# Patient Record
Sex: FEMALE | Race: WHITE | NOT HISPANIC OR LATINO | Employment: FULL TIME | URBAN - METROPOLITAN AREA
[De-identification: names, ages, dates, MRNs, and addresses within clinical notes are randomized per-mention and may not be internally consistent; named-entity substitution may affect disease eponyms.]

---

## 2017-01-09 ENCOUNTER — TELEPHONE (OUTPATIENT)
Dept: FAMILY MEDICINE | Facility: CLINIC | Age: 37
End: 2017-01-09

## 2017-01-09 DIAGNOSIS — G43.009 MIGRAINE WITHOUT AURA AND WITHOUT STATUS MIGRAINOSUS, NOT INTRACTABLE: Primary | ICD-10-CM

## 2017-01-09 RX ORDER — RIZATRIPTAN BENZOATE 10 MG/1
10 TABLET ORAL
Qty: 18 TABLET | Refills: 1 | Status: SHIPPED | OUTPATIENT
Start: 2017-01-09 | End: 2017-01-14

## 2017-01-09 NOTE — TELEPHONE ENCOUNTER
Pt calls, added rizatriptan refill to 12/9 refill, discussed refill process, informs topiramate decreasing to 2 migraines a month, occasional 3  Prescription approved per [facility] Refill Protocol.  Yasmine Arguello RN, BSN

## 2017-01-14 ENCOUNTER — TELEPHONE (OUTPATIENT)
Dept: FAMILY MEDICINE | Facility: CLINIC | Age: 37
End: 2017-01-14

## 2017-01-14 DIAGNOSIS — G43.009 MIGRAINE WITHOUT AURA AND WITHOUT STATUS MIGRAINOSUS, NOT INTRACTABLE: Primary | ICD-10-CM

## 2017-01-14 RX ORDER — RIZATRIPTAN BENZOATE 10 MG/1
10 TABLET ORAL
Qty: 18 TABLET | Refills: 1 | Status: SHIPPED | OUTPATIENT
Start: 2017-01-14 | End: 2017-07-03

## 2017-01-14 NOTE — TELEPHONE ENCOUNTER
Calling to have prescription changed to [facility] due to cost.    Grisel Coombs RN     [place] Nurse Advisor

## 2017-01-28 ENCOUNTER — TELEPHONE (OUTPATIENT)
Dept: FAMILY MEDICINE | Facility: CLINIC | Age: 37
End: 2017-01-28

## 2017-01-28 NOTE — TELEPHONE ENCOUNTER
Pt currently fills scripts at [facility]. She is wanting to transfer her Topamax rx to [facility] as she states it is too expensive at [facility]. Please call patient at [phone] OK to . Thank you.    Opal Butler Scheduling

## 2017-01-30 NOTE — TELEPHONE ENCOUNTER
Left detailed message (ok per note below) stating to have Shanda call to get rx from [facility].

## 2017-02-17 ENCOUNTER — VIRTUAL VISIT (OUTPATIENT)
Dept: FAMILY MEDICINE | Facility: OTHER | Age: 37
End: 2017-02-17

## 2017-02-17 NOTE — PROGRESS NOTES
"Date:   Clinician: Joel Wegener  Clinician NPI: [id number]  Patient: Marie Lal  Patient : 1980  Patient Address: [address] [place] [address]  Patient Phone: [phone]  Visit Protocol: URI  Patient Summary:  Marie is a 36 year old ( : 1980 ) female who initiated a Zip for a presumed sinus infection. When asked the question \"Do you have a [place] primary care physician?\", Marie responded \"Yes\".     Her  symptoms started gradually 10-13 days ago  and consist of malaise, nasal congestion, hoarse voice, ear pain, cough, and post-nasal drainage.   She denies rhinitis, dyspnea, loss of appetite, fever, chills, sore throat, chest pain, myalgias, itchy eyes, vomiting, nausea, and dysphagia. She denies a history of facial surgery.   Her minimal nasal secretions are green and yellow. Her moderate facial pain or pressure feels worse when bending over or leaning forward and is located on both sides of her head. The facial pain or pressure started after the onset of other URI symptoms.  Marie has a mild headache. The headache did not start before her other symptoms and is located on both sides of her head.   In the past year Marie has been diagnosed with one (1) episodes of sinusitis. When asked to feel her neck she reported enlarged lymph nodes. Marie noted that the enlarged neck lymph nodes developed AFTER the onset of upper respiratory symptoms. She denies axillary lymphadenopathy.   Regarding the ear pain, the patient denies tinnitus, pain if the mouth is fully open or teeth are clenched, experiencing pain when gently pulling on the earlobe, and recent injury to the area around the ear.   She reports having mild ear pain on the ear canal area of the right ear for 8-10 days. The patient hears normally despite the ear pain.   In addition to the ear pain, Marie also reports having the following symptoms:    A feeling of fullness in the ear as if it is " clogged   Marie denies having tenderness, swelling, and redness on her ear.   Additionally, she does not experience pain when bending the chin to the chest.   She has never had tympanostomy tube placement.    Her mild (a few coughs/hr) productive cough is more bothersome at night. She believes the cough is caused by post-nasal drainage. Her cough produces green sputum.   Her highest temperature was 96 degrees Fahrenheit. Her current temperature is 96 degrees Fahrenheit. Marie has had a fever for 1 - 2 days and used the oral method for measuring her temperature.   She has passed urine in the past 12 hours.   Marie denies having COPD or other chronic lung disease.   Pulse: self-reported pulse rate as: 12 beats in 10 seconds.   Current Temperature (F): 96     Weight (in lbs): 123   She states she is not pregnant and denies breastfeeding. She has menstruated in the past month.   Marie does not smoke or use smokeless tobacco.   MEDICATIONS:  No current medications , ALLERGIES:   penicillin/amoxicillin/augmentin    Clinician Response:  Dear Marie,  Based on the information you have provided, you likely have  acute sinusitis, otherwise known as a sinus infection.   I am prescribing an antibiotic Azithromycin to help you feel better. Azithromycin comes as a 250 mg tablet. Swallow two (2) tablets on the first day then one tablet a day for days 2-5. There is no refill with this prescription.   Sinus pressure occurs when the tissues lining your sinuses become swollen and inflamed. Afrin nasal spray decreases the swelling to provide the quickest and most effective relief from sinus pressure.  Use oxymetazoline (Afrin, or store brand) nasal spray. Spray once in each nostril twice per day for a maximum of 3 days. Using this medication more frequently or longer than recommended may cause nasal congestion to reoccur or worsen. This is an over-the-counter medication you can find at most pharmacies.   Unless your are  allergic to the over-the-counter medication(s) below, I recommend using:   Ibuprofen. Take 1-3 200mg tablets (200-600mg) every 8 hours to help with the discomfort. Make sure to take the ibuprofen with food. Do not exceed 2400mg in 24 hours. This is an over-the-counter medication that does not require a prescription.   Drink plenty of liquids, especially water and take time to rest your body. This may mean taking a nap or going to bed earlier. Your body is fighting an infection and liquids and rest will improve the pace of recovery. Remember to regularly wash your hands and avoid close contact with others to prevent spreading your infection.   Some people develop allergies to antibiotics. If you notice a new rash, significant swelling or difficulty breathing, stop the medication immediately and go into a clinic for physical evaluation.   Some women may also develop a yeast infection as a side effect of taking antibiotics. If you notice symptoms of a yeast infection, please use Zipnosis to get treatment.   If you become pregnant during this course of treatment with medication, stop taking the medication and contact your primary care clinician.   Diagnosis: Acute Sinusitis  Diagnosis ICD: J01.90  Prescription: azithromycin (Zithromax) 250mg oral tablet 6 tablets, 5 days supply. Take two tablets by mouth day 1, then 1 tablet days 2-5.. Refills: 0, Refill as needed: no, Allow substitutions: yes  Pharmacy: [id number] - [phone] - [address] [place] [address]

## 2017-05-07 ENCOUNTER — VIRTUAL VISIT (OUTPATIENT)
Dept: FAMILY MEDICINE | Facility: OTHER | Age: 37
End: 2017-05-07

## 2017-05-07 NOTE — PROGRESS NOTES
"Date:   Clinician: Shirlene Ha  Clinician NPI: [id number]  Patient: Marie Lal  Patient : 1980  Patient Address: [address] [place] [address]  Patient Phone: [phone]  Visit Protocol: URI  Patient Summary:  Marie is a 36 year old ( : 1980 ) female who initiated a Visit for a presumed sinus infection. When asked the question \"Please sign me up to receive news, health information and promotions. \", Marie responded \"No\".     Her  symptoms started gradually 3 weeks ago  and consist of rhinitis, malaise, ear pain, nasal congestion, and post-nasal drainage.   She denies myalgias, cough, dysphagia, chills, loss of appetite, fever, dyspnea, vomiting, nausea, chest pain, hoarse voice, sore throat, and itchy eyes. She denies a history of facial surgery.   Her minimal nasal secretions are white, green, and yellow. Her moderate facial pain or pressure feels worse when bending over or leaning forward and is located on both sides of her head. The facial pain or pressure started after the onset of other URI symptoms.  Marie has a mild headache. The headache did not start before her other symptoms and is located on both sides of her head.   In the past year Marie has been diagnosed with one (1) episodes of sinusitis. When asked to feel her neck she denied feeling enlarged lymph nodes. She denies axillary lymphadenopathy.   Regarding the ear pain, the patient denies experiencing pain when gently pulling on the earlobe, recent injury to the area around the ear, pain if the mouth is fully open or teeth are clenched, and tinnitus.   She reports having mild ear pain on the ear canal area of both ears for 8-10 days. The patient hears normally despite the ear pain.   In addition to the ear pain, Marie also reports having the following symptoms:    A feeling of fullness in the ear as if it is clogged   Marie denies having redness, swelling, and tenderness on her " ear.   Additionally, she does not experience pain when bending the chin to the chest.   She has never had tympanostomy tube placement.    Her highest temperature was 96.7 degrees Fahrenheit and her current temperature is 96.8 degrees Fahrenheit. She used the temporal method for measuring her temperature.   She has passed urine in the past 12 hours.   Marie denies having COPD or other chronic lung disease.   Pulse: self-reported pulse rate as: 12 beats in 10 seconds.   Current Temperature (F): 96.8     Weight (in lbs): 123   She states she is not pregnant and denies breastfeeding. She has menstruated in the past month.   Marie does not smoke or use smokeless tobacco.   Additional information as reported by the patient (free text): I'd like to know if I could try a 10 antibiotic vs a 5 day as the 5 day ones don't seem to fully get rid of the infection and I end up with another infection soon after.   MEDICATIONS:  No current medications , ALLERGIES:   penicillin/amoxicillin/augmentin    Clinician Response:  Dear Marie,  Based on the information you have provided, you likely have  acute sinusitis, otherwise known as a sinus infection.   I am prescribing Doxycycline Hyclate [Vibramycin] 100 mg. Take one tablet by mouth two times a day for 7 days. There are no refills with this prescription.   Sinus pressure occurs when the tissues lining your sinuses become swollen and inflamed. Afrin nasal spray decreases the swelling to provide the quickest and most effective relief from sinus pressure. Use oxymetazoline (Afrin, or store brand) nasal spray. Spray once in each nostril twice per day for a maximum of 3 days. Using this medication more frequently or longer than recommended may cause nasal congestion to reoccur or worsen. This is an over-the-counter medication you can find at most pharmacies.   I am also prescribing Flonase nasal spray. Flonase will also help reduce swelling in your sinuses, but it works differently  than Afrin, so use both nasal sprays. Spray the Flonase twice (2 times) in each nostril every day at approximately the same time each day until you feel better. This medication takes several days to start working, so keep taking it if it doesn't help right away.   Drink plenty of liquids, especially water and take time to rest your body. This may mean taking a nap or going to bed earlier. Your body is fighting an infection and liquids and rest will improve the pace of recovery. Remember to regularly wash your hands and avoid close contact with others to prevent spreading your infection.   Some people develop allergies to antibiotics. If you notice a new rash, significant swelling or difficulty breathing, stop the medication immediately and go into a clinic for physical evaluation.   Some women may also develop a yeast infection as a side effect of taking antibiotics. If you notice symptoms of a yeast infection, please use [facility] to get treatment.   If you become pregnant during this course of treatment with medication, stop taking the medication and contact your primary care provider.   Diagnosis: Acute Sinusitis  Diagnosis ICD: J01.90  Prescription: doxycycline Hyclate (Vibramycin) 100mg oral tablet 14 tablets, 7 days supply. Take one tablet by mouth two times a day for 7 days. Refills: 0, Refill as needed: no, Allow substitutions: yes  Prescription: fluticasone propionate (Flonase) 50mcg nasal spray 16 gm, 30 days supply. Take one or two inhalations in each nostril one time a day. Refills: 0, Refill as needed: no, Allow substitutions: yes  Prescription Sent At: May 07 11:26:04, 2017  Pharmacy: [id number] - [phone] - [address] [place] [address]

## 2017-07-03 ENCOUNTER — VIRTUAL VISIT (OUTPATIENT)
Dept: FAMILY MEDICINE | Facility: OTHER | Age: 37
End: 2017-07-03

## 2017-07-03 DIAGNOSIS — G43.009 MIGRAINE WITHOUT AURA AND WITHOUT STATUS MIGRAINOSUS, NOT INTRACTABLE: ICD-10-CM

## 2017-07-03 NOTE — TELEPHONE ENCOUNTER
Riztriptan      Last Written Prescription Date: 01/14/17  Last Fill Quantity: 18, # refills: 1  Last Office Visit with [facility], [facility] or [facility] prescribing provider: 10/20/16 w/Dr Walden       BP Readings from Last 3 Encounters:   10/20/16 99/67   11/11/15 98/60   10/08/15 106/60

## 2017-07-03 NOTE — PROGRESS NOTES
"Date:   Clinician: Tiffani Luna  Clinician NPI: [id number]  Patient: Marie Lal  Patient : 1980  Patient Address: [address] [place] [address]  Patient Phone: [phone]  Visit Protocol: URI  Patient Summary:  Marie is a 36 year old ( : 1980 ) female who initiated a Visit for a presumed sinus infection. When asked the question \"Please sign me up to receive news, health information and promotions. \", Marie responded \"No\".     Her  symptoms started gradually 10-13 days ago  and consist of rhinitis, nasal congestion, post-nasal drainage, malaise, cough, ear pain, and sore throat.   She denies headache, hoarse voice, fever, chills, loss of appetite, dysphagia, itchy eyes, myalgias, chest pain, dyspnea, nausea, petechial or purpuric rash, and vomiting. She denies a history of facial surgery.   Her minimal nasal secretions are green and yellow. Her moderate facial pain or pressure feels worse when bending over or leaning forward and is located on both sides of her head. The facial pain or pressure started after the onset of other URI symptoms.    In the past year Marie has been diagnosed with two (2) episodes of sinusitis.   She has a mildly painful sore throat. The patient indicates having white spots on the tonsils similar to a sample strep throat image provided. She might have been exposed to Strep. When asked to feel her neck she reported enlarged lymph nodes. Marie noted that the enlarged neck lymph nodes developed AFTER the onset of upper respiratory symptoms. She denies axillary lymphadenopathy.   Regarding the ear pain, the patient denies pain if the mouth is fully open or teeth are clenched, tinnitus, recent injury to the area around the ear, and experiencing pain when gently pulling on the earlobe.   She reports having mild ear pain on the ear canal area of both ears for 8-10 days. The ear pain has caused a slight decrease in hearing.   In " addition to the ear pain, Marie also reports having the following symptoms:    A feeling of fullness in the ear as if it is clogged   Marie denies having swelling, tenderness, and redness on her ear.   Additionally, she does not experience pain when bending the chin to the chest.   She has never had tympanostomy tube placement.    Her mild (a few coughs/hr) productive cough is more bothersome at night. She believes the cough is caused by post-nasal drainage. Her cough produces yellow sputum.   Her highest temperature was 97.6 degrees Fahrenheit and her current temperature is 97.6 degrees Fahrenheit. She used the temporal method for measuring her temperature.   She has passed urine in the past 12 hours.   Marie denies having COPD or other chronic lung disease.   Pulse: self-reported pulse rate as: 13 beats in 10 seconds.   Current Temperature (F): 97.6     Weight (in lbs): 125   She states she is not pregnant and denies breastfeeding. She has menstruated in the past month.   Marie does not smoke or use smokeless tobacco.  MEDICATIONS:   Patient free text response:  Topiramate  , ALLERGIES:   penicillin/amoxicillin/augmentin    Clinician Response:  Dear Marie,  Based on the information you have provided, you likely have  acute sinusitis, otherwise known as a sinus infection. I am prescribing sulfamethoxazole-TMP DS (Bactrim DS) 800-160mg. Take one tablet by mouth two times a day for 14 days.   Unless your are allergic to the over-the-counter medication(s) below, I recommend using:   Saline nasal spray (such as Ocean or store brand). Use 1-2 sprays in each nostril 3 times a day as needed for congestion. This is an over-the-counter medication that does not require a prescription.   Try the following to help with your throat pain and discomfort:     Use throat lozenges    Gargle with warm salt water (1/4 teaspoon of salt per 8 ounce glass of water).    Suck on frozen items such as Popsicles or ice cubes.      Call 911 or go to the emergency room if you feel that your throat is closing off, you suddenly develop a rash, you are unable to swallow fluids, you are drooling, or you are having difficulty breathing.  Follow up with your primary care provider if your symptoms are not improving in 3-4 days.   Drink plenty of liquids, especially water and take time to rest your body. This may mean taking a nap or going to bed earlier. Your body is fighting an infection and liquids and rest will improve the pace of recovery. Remember to regularly wash your hands and avoid close contact with others to prevent spreading your infection.   Some people develop allergies to antibiotics. If you notice a new rash, significant swelling or difficulty breathing, stop the medication immediately and go into a clinic for physical evaluation.   Some women may also develop a yeast infection as a side effect of taking antibiotics. If you notice symptoms of a yeast infection, please use [facility] to get treatment.   If you become pregnant during this course of treatment with medication, stop taking the medication and contact your primary care provider.   Diagnosis: Acute Sinusitis  Diagnosis ICD: J01.90  Prescription: Sulfamethoxazole-TMP DS (Bactrim DS) 800-160 mg oral tablet 28 tablets, 14 days supply. Take one tablet by mouth two times a day for 14 days. Refills: 0, Refill as needed: no, Allow substitutions: yes  Pharmacy: [id number] - [phone] - [address] [place] [address]

## 2017-07-05 NOTE — TELEPHONE ENCOUNTER
Routing refill request to provider for review/approval because  Visit due?  Van Mulligan, RN

## 2017-07-06 RX ORDER — RIZATRIPTAN BENZOATE 10 MG/1
TABLET ORAL
Qty: 18 TABLET | Refills: 0 | Status: SHIPPED | OUTPATIENT
Start: 2017-07-06 | End: 2017-10-03

## 2017-10-03 ENCOUNTER — VIRTUAL VISIT (OUTPATIENT)
Dept: FAMILY MEDICINE | Facility: OTHER | Age: 37
End: 2017-10-03

## 2017-10-03 DIAGNOSIS — G43.009 MIGRAINE WITHOUT AURA AND WITHOUT STATUS MIGRAINOSUS, NOT INTRACTABLE: ICD-10-CM

## 2017-10-03 NOTE — PROGRESS NOTES
"Date:   Clinician: Faheem Jordan  Clinician NPI: [id number]  Patient: Marie Lal  Patient : 1980  Patient Address:  [facility], [place], [place] [address]  Patient Phone: [phone]  Visit Protocol: URI  Patient Summary:  Marie is a 37 year old ( : 1980 ) female who initiated a Visit for cold, sinus infection, or influenza. When asked the question \"Please sign me up to receive news, health information and promotions. \", Marie responded \"No\".    Marie states her symptoms started gradually 1 month or more ago.   Her symptoms consist of nasal congestion, malaise, myalgia, a sore throat, rhinitis, ear pain, facial pain or pressure, a headache, and tooth pain.   Symptom Details     Nasal secretions: The color of her mucus is green and yellow.    Sore throat: Marie reports having mild throat pain, does not have exudate on her tonsils, and is able to swallow liquids. The lymph nodes in her neck are not enlarged. She states that rashes have not appeared on the skin since the sore throat started.     Facial pain or pressure: She has not had the facial pain or pressure for 12 weeks or more. The facial pain or pressure feels worse when bending over or leaning forward.     Headache: Marie has not had the headache for 12 weeks or more. She states the headache is moderate.     Tooth pain: The tooth pain is not caused by a cavity, recent dental work, or other mouth problems.      Marie denies having enlarged lymph nodes, wheezing, dyspnea, cough, chills, and fever. She also denies taking antibiotic medication for the symptoms, having recent facial or sinus surgery in the past 60 days, and double sickening.   Within the past week, Marie has not been exposed to someone with strep throat. She has not recently been exposed to someone with influenza. Marie has been in close contact with the following high risk individuals: people with asthma, heart disease or diabetes and " children under the age of 5.   Weight: 123 lbs   Marie does not smoke or use smokeless tobacco.   She denies pregnancy and denies breastfeeding. She is currently menstruating.  MEDICATIONS:   Patient free text response: Rizatriptin  , ALLERGIES:   penicillin/amoxicillin/augmentin    Clinician Response:  Dear Marie,  Based on the information you have provided, you likely have  acute bacterial sinusitis, otherwise known as a sinus infection.  I am prescribing fluticasone propionate nasal (Flonase) 50 mcg/spray. Take one or two inhalations in each nostril one time a day. There are no refills with this prescription.   I am prescribing cefdinir (Omnicef). Take one capsule by mouth two times a day for 10 days. There are no refills with this prescription.   Unless your are allergic to the over-the-counter medication(s) below, I recommend using:   Guaifenesin + dextromethorphan (Robitussin DM, Mucinex DM). This is an over-the-counter medication that does not require a prescription.   A decongestant such as pseudoephedrine (Sudafed or store brand) to help your symptoms. This is an over-the-counter medication that does not require a prescription.   Ibuprofen. Take 1-3 tablets (200-600mg) every 8 hours to help with the discomfort. Make sure to take the ibuprofen with food. Do not exceed 2400mg in 24 hours. This is an over-the-counter medication that does not require a prescription.   Some people develop allergies to antibiotics. If you have significant swelling or difficulty breathing, stop the medication immediately and call 911 or go to an emergency room. If you notice a new rash, be seen at a clinic or urgent care.  Some women may also develop a yeast infection as a side effect of taking antibiotics. If you notice symptoms of a yeast infection, please use [facility] to get treatment.  If you become pregnant during this course of treatment, stop taking the medication and contact your primary care provider.  You will feel  better faster if you take care of yourself by getting more rest and drinking plenty of liquids, especially water.  Remember to wash your hands often and stay home while you are sick to decrease the chance you will spread your infection to others.  Mild ear pain is a common symptom of an upper respiratory infection and should lessen gradually as other symptoms improve.  Try the following to help with your throat pain and discomfort:     Use throat lozenges    Gargle with warm salt water (1/4 teaspoon of salt per 8 ounce glass of water)    Suck on frozen items such as popsicles or ice cubes     Call 911 or go to the emergency room if you feel that your throat is closing off, you suddenly develop a rash, you are unable to swallow fluids, you are drooling, or you are having difficulty breathing.   Diagnosis: Acute bacterial sinusitis  Diagnosis ICD: J01.90  Prescription: fluticasone propionate (Flonase) 50mcg nasal spray 16 gm, 30 days supply. Take one or two inhalations in each nostril one time a day. Refills: 0, Refill as needed: no, Allow substitutions: yes  Prescription: cefdinir (Omnicef) 300 mg oral capsule 20 capsules, 10 days supply. Take one capsule by mouth two times a day for 10 days. Refills: 0, Refill as needed: no, Allow substitutions: yes  Pharmacy: [id number] - [phone] - [address] [place] [address]

## 2017-10-03 NOTE — TELEPHONE ENCOUNTER
RIZATRIPTAN 10MG TAB        Last Written Prescription Date: 07/06/17  Last Fill Quantity: 18, # refills: 0  Last Office Visit with [facility], [facility] or [facility] prescribing provider: 10/20/16 Dr Walden        BP Readings from Last 3 Encounters:   10/20/16 99/67   11/11/15 98/60   10/08/15 106/60

## 2017-10-04 ENCOUNTER — MYC MEDICAL ADVICE (OUTPATIENT)
Dept: FAMILY MEDICINE | Facility: CLINIC | Age: 37
End: 2017-10-04

## 2017-10-04 RX ORDER — RIZATRIPTAN BENZOATE 10 MG/1
TABLET ORAL
Qty: 18 TABLET | Refills: 0 | Status: SHIPPED | OUTPATIENT
Start: 2017-10-04 | End: 2017-10-12

## 2017-10-04 NOTE — TELEPHONE ENCOUNTER
Prescription approved per [facility] Refill Protocol.  Pt informed via MyChart visit due before next refill  Van Mulligan RN

## 2017-10-12 ENCOUNTER — OFFICE VISIT (OUTPATIENT)
Dept: FAMILY MEDICINE | Facility: CLINIC | Age: 37
End: 2017-10-12
Payer: COMMERCIAL

## 2017-10-12 VITALS
HEIGHT: 61 IN | SYSTOLIC BLOOD PRESSURE: 99 MMHG | HEART RATE: 92 BPM | WEIGHT: 127 LBS | RESPIRATION RATE: 20 BRPM | DIASTOLIC BLOOD PRESSURE: 65 MMHG | TEMPERATURE: 98.5 F | BODY MASS INDEX: 23.98 KG/M2 | OXYGEN SATURATION: 99 %

## 2017-10-12 DIAGNOSIS — G43.009 MIGRAINE WITHOUT AURA AND WITHOUT STATUS MIGRAINOSUS, NOT INTRACTABLE: ICD-10-CM

## 2017-10-12 PROCEDURE — 99214 OFFICE O/P EST MOD 30 MIN: CPT | Performed by: FAMILY MEDICINE

## 2017-10-12 RX ORDER — RIZATRIPTAN BENZOATE 10 MG/1
TABLET ORAL
Qty: 18 TABLET | Refills: 0 | Status: SHIPPED | OUTPATIENT
Start: 2017-10-12 | End: 2018-01-17

## 2017-10-12 RX ORDER — TOPIRAMATE 50 MG/1
50 TABLET, FILM COATED ORAL 2 TIMES DAILY
Qty: 180 TABLET | Refills: 3 | Status: SHIPPED | OUTPATIENT
Start: 2017-10-12 | End: 2018-04-12

## 2017-10-12 NOTE — MR AVS SNAPSHOT
After Visit Summary   10/12/2017    Marie Lal    MRN: [id number]           Patient Information     Date Of Birth          1980        Visit Information        Provider Department      10/12/2017 4:45 PM Melva Walden MD [facility]        Today's Diagnoses     Migraine without aura and without status migrainosus, not intractable          Care Instructions    In controlled months : take Topamax 50 mg 1/2 in Am and 1/2 at night.  In worse months, take Topamax 50 mg 1 tab in Am, and 1/2 to 1 tab at night .          Follow-ups after your visit        Who to contact     If you have questions or need follow up information about today's clinic visit or your schedule please contact [facility] directly at [phone].  Normal or non-critical lab and imaging results will be communicated to you by MyChart, letter or phone within 4 business days after the clinic has received the results. If you do not hear from us within 7 days, please contact the clinic through MyChart or phone. If you have a critical or abnormal lab result, we will notify you by phone as soon as possible.  Submit refill requests through [facility] or call your pharmacy and they will forward the refill request to us. Please allow 3 business days for your refill to be completed.          Additional Information About Your Visit        MyChart Information     [facility] gives you secure access to your electronic health record. If you see a primary care provider, you can also send messages to your care team and make appointments. If you have questions, please call your primary care clinic.  If you do not have a primary care provider, please call [phone] and they will assist you.        Care EveryWhere ID     This is your Care EveryWhere ID. This could be used by other organizations to access your [place] medical records  [id number]        Your Vitals Were     Pulse Temperature Respirations Height  "Pulse Oximetry BMI (Body Mass Index)    92 98.5  F (36.9  C) (Oral) 20 5' 1\" (1.549 m) 99% 24 kg/m2       Blood Pressure from Last 3 Encounters:   10/12/17 99/65   10/20/16 99/67   11/11/15 98/60    Weight from Last 3 Encounters:   10/12/17 127 lb (57.6 kg)   10/20/16 125 lb (56.7 kg)   11/11/15 125 lb (56.7 kg)              Today, you had the following     No orders found for display         Today's Medication Changes          These changes are accurate as of: 10/12/17  5:16 PM.  If you have any questions, ask your nurse or doctor.               These medicines have changed or have updated prescriptions.        Dose/Directions    rizatriptan 10 MG tablet   Commonly known as:  MAXALT   This may have changed:  See the new instructions.   Used for:  Migraine without aura and without status migrainosus, not intractable   Changed by:  Melva Walden MD        TAKE ONE TABLET BY MOUTH AT ONSET OF HEADACHE FOR MIGRAINE. MAY REPEAT DOSE IN 2 HOURS. DO NOT TAKE MORE THAN 3 TABLETS IN 24 HOURS.   Quantity:  18 tablet   Refills:  0       topiramate 50 MG tablet   Commonly known as:  TOPAMAX   This may have changed:    - medication strength  - how much to take   Used for:  Migraine without aura and without status migrainosus, not intractable   Changed by:  Melva Walden MD        Dose:  50 mg   Take 1 tablet (50 mg) by mouth 2 times daily   Quantity:  180 tablet   Refills:  3            Where to get your medicines      These medications were sent to [facility] [address] [place] [id number] [facility]  [address] [facility] [place] [address]     Phone:  [phone]     rizatriptan 10 MG tablet    topiramate 50 MG tablet                Primary Care Provider Office Phone # Fax #    Melva Walden [phone] [address]  [facility] [address]        Equal Access to Services     RADHA JAMISON AH: Hadii aad ku hadasho Soomaali, waaxda luqadaha, qaybta kaalmada adecorinna, meri babb " david dumont ah. So [facility] [place] [phone].    ATENCIÓN: Si mata apple, tiene a العراقي disposición servicios gratuitos de asistencia lingüística. Yvette al [phone].    We comply with applicable federal civil rights laws and [place] laws. We do not discriminate on the basis of race, color, national origin, age, disability, sex, sexual orientation, or gender identity.            Thank you!     Thank you for choosing [facility]  for your care. Our goal is always to provide you with excellent care. Hearing back from our patients is one way we can continue to improve our services. Please take a few minutes to complete the written survey that you may receive in the mail after your visit with us. Thank you!             Your Updated Medication List - Protect others around you: Learn how to safely use, store and throw away your medicines at www.disposemymeds.org.          This list is accurate as of: 10/12/17  5:16 PM.  Always use your most recent med list.                   Brand Name Dispense Instructions for use Diagnosis    rizatriptan 10 MG tablet    MAXALT    18 tablet    TAKE ONE TABLET BY MOUTH AT ONSET OF HEADACHE FOR MIGRAINE. MAY REPEAT DOSE IN 2 HOURS. DO NOT TAKE MORE THAN 3 TABLETS IN 24 HOURS.    Migraine without aura and without status migrainosus, not intractable       topiramate 50 MG tablet    TOPAMAX    180 tablet    Take 1 tablet (50 mg) by mouth 2 times daily    Migraine without aura and without status migrainosus, not intractable

## 2017-10-12 NOTE — PATIENT INSTRUCTIONS
In controlled months : take Topamax 50 mg 1/2 in Am and 1/2 at night.  In worse months, take Topamax 50 mg 1 tab in Am, and 1/2 to 1 tab at night .

## 2017-10-12 NOTE — NURSING NOTE
"Chief Complaint   Patient presents with     Recheck Medication     migraine medications     Refill Request       Initial BP 99/65 (BP Location: Right arm, Patient Position: Chair, Cuff Size: Adult Regular)  Pulse 92  Temp 98.5  F (36.9  C) (Oral)  Resp 20  Ht 5' 1\" (1.549 m)  Wt 127 lb (57.6 kg)  SpO2 99%  BMI 24 kg/m2 Estimated body mass index is 24 kg/(m^2) as calculated from the following:    Height as of this encounter: 5' 1\" (1.549 m).    Weight as of this encounter: 127 lb (57.6 kg).  Medication Reconciliation: complete   Maryse Rodriges, TERRI      "

## 2017-10-12 NOTE — PROGRESS NOTES
SUBJECTIVE:   Marie Lal is a 37 year old female who presents to clinic today for the following health issues:      Medication Followup of migraine medications    Taking Medication as prescribed: yes    Side Effects:  None    Medication Helping Symptoms:  yes         Headaches      Duration: chornic.    Description  Location: unilateral in the left frontal area   Character: throbbing pain  Frequency:  Varies, sometimes 3 to 4 times a week, sometimes once every 2 weeks.  Duration:  Day.    Intensity:  severe    Accompanying signs and symptoms:    Precipitating or Alleviating factors:  Nausea/vomiting: sometimes  Dizziness: no  Weakness or numbness: no  Visual changes: none  Fever: no   Sinus or URI symptoms YES    History  Head trauma: no  Family history of migraines: YES, grandmother.  Previous tests for headaches: YES-   Neurologist evaluations: YES  Able to do daily activities when headache present: no  Wake with headaches: sometimes.  Daily pain medication use: no  Any changes in recent stresslevel, none.    Precipitating or Alleviating factors (light/sound/sleep/caffeine): stress.    Therapies tried and outcome: Maxalt    Outcome - effective  Frequent/daily pain medication use: no        Problem list and histories reviewed & adjusted, as indicated.  Additional history: as documented    Patient Active Problem List   Diagnosis     CARDIOVASCULAR SCREENING; LDL GOAL LESS THAN 160     Migraine headache     Past Surgical History:   Procedure Laterality Date      SECTION        SECTION  2013    Procedure:  SECTION;   SECTION ;  Surgeon: Joey Chirinos MD;  Location:  [facility]       Social History   Substance Use Topics     Smoking status: Former Smoker     Years: 1.00     Quit date: 2013     Smokeless tobacco: Never Used      Comment: quit with diagnosis of pregnancy     Alcohol use No     Family History   Problem Relation Age of Onset     Hypertension Mother   "    CANCER Maternal Grandfather      Bone         Current Outpatient Prescriptions   Medication Sig Dispense Refill     rizatriptan (MAXALT) 10 MG tablet TAKE ONE TABLET BY MOUTH AT ONSET OF HEADACHE FOR MIGRAINE. MAY REPEAT DOSE IN 2 HOURS. DO NOT TAKE MORE THAN 3 TABLETS IN 24 HOURS. 18 tablet 0     topiramate (TOPAMAX) 50 MG tablet Take 1 tablet (50 mg) by mouth 2 times daily 180 tablet 3     [DISCONTINUED] topiramate (TOPAMAX) 25 MG tablet Take 1 tablet (25 mg) by mouth 2 times daily 180 tablet 3         Reviewed and updated as needed this visit by clinical staffTobacco  Allergies  Med Hx  Surg Hx  Fam Hx  Soc Hx      Reviewed and updated as needed this visit by Provider         ROS:  C: NEGATIVE for fever, chills, change in weight  NEURO: NEGATIVE for weakness, dizziness or paresthesias    OBJECTIVE:     BP 99/65 (BP Location: Right arm, Patient Position: Chair, Cuff Size: Adult Regular)  Pulse 92  Temp 98.5  F (36.9  C) (Oral)  Resp 20  Ht 5' 1\" (1.549 m)  Wt 127 lb (57.6 kg)  SpO2 99%  BMI 24 kg/m2  Body mass index is 24 kg/(m^2).  GENERAL: healthy, alert and no distress  NEURO: Normal strength and tone, sensory exam grossly normal, mentation intact and cranial nerves 2-12 intact        ASSESSMENT/PLAN:             1. Migraine without aura and without status migrainosus, not intractable  mnot well controlled, increase topamax to 50 mg twice daily  - rizatriptan (MAXALT) 10 MG tablet; TAKE ONE TABLET BY MOUTH AT ONSET OF HEADACHE FOR MIGRAINE. MAY REPEAT DOSE IN 2 HOURS. DO NOT TAKE MORE THAN 3 TABLETS IN 24 HOURS.  Dispense: 18 tablet; Refill: 0  - topiramate (TOPAMAX) 50 MG tablet; Take 1 tablet (50 mg) by mouth 2 times daily  Dispense: 180 tablet; Refill: 3    Follow up in 6 months to 1 year.    Melva Walden MD  [facility]"

## 2017-10-30 ENCOUNTER — TRANSFERRED RECORDS (OUTPATIENT)
Dept: HEALTH INFORMATION MANAGEMENT | Facility: CLINIC | Age: 37
End: 2017-10-30

## 2017-11-02 ENCOUNTER — TRANSFERRED RECORDS (OUTPATIENT)
Dept: HEALTH INFORMATION MANAGEMENT | Facility: CLINIC | Age: 37
End: 2017-11-02

## 2017-11-03 ENCOUNTER — OFFICE VISIT (OUTPATIENT)
Dept: URGENT CARE | Facility: URGENT CARE | Age: 37
End: 2017-11-03
Payer: COMMERCIAL

## 2017-11-03 VITALS
WEIGHT: 127 LBS | BODY MASS INDEX: 24 KG/M2 | DIASTOLIC BLOOD PRESSURE: 80 MMHG | OXYGEN SATURATION: 98 % | HEART RATE: 84 BPM | TEMPERATURE: 97.8 F | SYSTOLIC BLOOD PRESSURE: 120 MMHG

## 2017-11-03 DIAGNOSIS — A04.72 C. DIFFICILE COLITIS: ICD-10-CM

## 2017-11-03 DIAGNOSIS — B37.0 THRUSH: Primary | ICD-10-CM

## 2017-11-03 DIAGNOSIS — K14.3 COATED TONGUE: ICD-10-CM

## 2017-11-03 LAB
KOH PREP SPEC: ABNORMAL
SPECIMEN SOURCE: ABNORMAL

## 2017-11-03 PROCEDURE — 87210 SMEAR WET MOUNT SALINE/INK: CPT | Performed by: PHYSICIAN ASSISTANT

## 2017-11-03 PROCEDURE — 99214 OFFICE O/P EST MOD 30 MIN: CPT | Performed by: PHYSICIAN ASSISTANT

## 2017-11-03 RX ORDER — METRONIDAZOLE 500 MG/1
500 TABLET ORAL
COMMUNITY
Start: 2017-10-30 | End: 2017-11-13

## 2017-11-03 RX ORDER — CLOTRIMAZOLE 10 MG/1
1 LOZENGE ORAL
Qty: 70 TROCHE | Refills: 0 | Status: SHIPPED | OUTPATIENT
Start: 2017-11-03 | End: 2017-11-17

## 2017-11-03 NOTE — PROGRESS NOTES
SUBJECTIVE:   Marie Lal is a 37 year old female presenting with a chief complaint of numbness of tongue and white coating on tongue since starting metronidazole for presumptive C. Diff colitis.  She was seen at [facility] in [place] and obtained a CT scan which showed diffuse thickening of colon c/w colitis.  She had recently completed a course of Omnicef for sinusitis. Today she noticed a numbness of her tongue and cheeks and white patches in those areas as well.  Onset of symptoms was 1 day(s) ago.  Course of illness is worsening.    Severity moderate  Current and Associated symptoms: body aches and fatigue  Treatment measures tried include None tried.  Predisposing factors include multiple antibiotics (Omnicef and metronidazole)    Past Medical History:   Diagnosis Date     Epilepsy seizure, generalized, convulsive (H)     resolved, last took med 1/2006     Menarche 15     Migraine     rare, takes motrin, 1 per month at most, contolled     Seizures (H)     None for 10 years - no medications     Shingles 30    right buttock     Varicella 9     Current Outpatient Prescriptions   Medication Sig Dispense Refill     metroNIDAZOLE (FLAGYL) 500 MG tablet Take 500 mg by mouth       rizatriptan (MAXALT) 10 MG tablet TAKE ONE TABLET BY MOUTH AT ONSET OF HEADACHE FOR MIGRAINE. MAY REPEAT DOSE IN 2 HOURS. DO NOT TAKE MORE THAN 3 TABLETS IN 24 HOURS. 18 tablet 0     topiramate (TOPAMAX) 50 MG tablet Take 1 tablet (50 mg) by mouth 2 times daily 180 tablet 3     Social History   Substance Use Topics     Smoking status: Former Smoker     Years: 1.00     Quit date: 1/1/2013     Smokeless tobacco: Never Used      Comment: quit with diagnosis of pregnancy     Alcohol use No       ROS:  Review of systems negative except as stated above.    OBJECTIVE:  /80 (BP Location: Right arm, Cuff Size: Adult Regular)  Pulse 84  Temp 97.8  F (36.6  C) (Oral)  Wt 127 lb (57.6 kg)  LMP 11/02/2017  SpO2 98%  BMI 24  kg/m2  GENERAL APPEARANCE: healthy, alert and no distress  EYES: EOMI,  PERRL, conjunctiva clear  HENT: ear canals and TM's normal.  Nose and mouth without ulcers, erythema or lesions  SKIN: no suspicious lesions or rashes    Results for orders placed or performed in visit on 11/03/17   KOH prep (Other than skin, nails, hair)   Result Value Ref Range    Specimen Description Oral     KOH Prep Fungal elements seen (A)        ASSESSMENT:  Marie was seen today for urgent care and mouth problem.    Diagnoses and all orders for this visit:    Thrush,   C. difficile colitis  Coated tongue  KOH prep and clinical presentation c/w thrush.  Due to medication interaction unable to use nystatin swish/swallow.  Will have pt start clotrimazole david 5x/daily for 2 days after symptom resolution.  Pt voiced understanding.  She will f/u with PCP if not improving.  -     clotrimazole 10 MG david; Place 1 David (10 mg) inside cheek 5 times daily for 14 days  -     KOH prep (Other than skin, nails, hair)      Ghazala Landry MS, PAKevinC

## 2017-11-03 NOTE — PATIENT INSTRUCTIONS
Thrush  What is thrush?  Thrush is a fungal infection caused by a yeast called candida. In babies, thrush often occurs in areas where the lining of the mouth is irritated. Many babies get thrush in the mouth in the first few weeks or months of life. If your child has thrush, he or she will have:  White, irregularly shaped patches coat the inside of the lips and cheeks and sometimes the tongue. (If the only symptom is a uniformly white tongue, it's due to a milk diet, not thrush.)   White coating that sticks to the mouth and cannot be washed away or wiped off.  Thrush causes mild mouth discomfort.  What is the cause?  Most people already have candida in their mouth and other parts of their bodies. In babies, thrush often occurs in areas where the lining of the mouth is irritated from too much sucking (as when a baby sleeps with a bottle or pacifier). A large pacifier or nipple can more easily injure the lining of the mouth. Thrush is generally not spread to others under normal conditions. However, if you are breast-feeding and your baby has thrush, the yeast could flare up and cause thrush on your breasts.  How can I take care of my child?  Nystatin oral medicineThe drug for clearing this up is nystatin oral suspension. It requires a prescription.Give 1 ml of nystatin 4 times a day after meals or at least 30 minutes before you feed your baby. Place the nystatin in the front of the mouth on each side. It doesn't do any good once it's swallowed. If the patches of thrush in the mouth don't start improving in 2 days, rub the nystatin directly on the patches. Use a cotton swab or a gauze wrapped on your finger. Keep this up for at least 7 days, or until all the thrush has been gone for 3 days.If you are breast-feeding, apply nystatin to any irritated areas on your nipples.   Decrease sucking time during feedingIf sucking on a nipple is painful for your child, temporarily use a cup and spoon. In any case,  while your child has thrush, reduce sucking time to 20 minutes or less per feeding.If the thrush comes back after treatment and your child is bottle-fed, switch to a nipple with a different shape and made from silicone.   Restrict pacifier useWhile your child has thrush don't give him a pacifier, except when it's really needed to calm your baby. If your infant is using an orthodontic-type pacifier, switch to a smaller, regular one. Special washing or boiling of bottle nipples or pacifiers is not necessary or helpful.   Diaper rash associated with thrushIf your child has a diaper rash as well as thrush, assume the rash is caused by yeast. Buy Lotrimin cream at your local pharmacy and put it on your baby's bottom 4 times a day.  When should I call my child's healthcare provider?  Call during office hours if:  Your child refuses to drink.   The thrush gets worse during treatment.   The thrush lasts beyond 10 days despite treatment.   You have other concerns or questions.    Published by [employer].  This content is reviewed periodically and is subject to change as new health information becomes available. The information is intended to inform and educate and is not a replacement for medical evaluation, advice, diagnosis or treatment by a healthcare professional.

## 2017-11-03 NOTE — PROGRESS NOTES
Results discussed directly with patient while patient was present. Any further details documented in the note.   Ghazala Landry PA-C

## 2017-11-03 NOTE — MR AVS SNAPSHOT
After Visit Summary   11/3/2017    Marie Lal    MRN: [id number]           Patient Information     Date Of Birth          1980        Visit Information        Provider Department      11/3/2017 5:20 PM Ghazala Landry PA-C [facility]        Today's Diagnoses     Thrush    -  1    Coated tongue        C. difficile colitis          Care Instructions                  Thrush  What is thrush?  Thrush is a fungal infection caused by a yeast called candida. In babies, thrush often occurs in areas where the lining of the mouth is irritated. Many babies get thrush in the mouth in the first few weeks or months of life. If your child has thrush, he or she will have:  White, irregularly shaped patches coat the inside of the lips and cheeks and sometimes the tongue. (If the only symptom is a uniformly white tongue, it's due to a milk diet, not thrush.)   White coating that sticks to the mouth and cannot be washed away or wiped off.  Thrush causes mild mouth discomfort.  What is the cause?  Most people already have candida in their mouth and other parts of their bodies. In babies, thrush often occurs in areas where the lining of the mouth is irritated from too much sucking (as when a baby sleeps with a bottle or pacifier). A large pacifier or nipple can more easily injure the lining of the mouth. Thrush is generally not spread to others under normal conditions. However, if you are breast-feeding and your baby has thrush, the yeast could flare up and cause thrush on your breasts.  How can I take care of my child?  Nystatin oral medicineThe drug for clearing this up is nystatin oral suspension. It requires a prescription.Give 1 ml of nystatin 4 times a day after meals or at least 30 minutes before you feed your baby. Place the nystatin in the front of the mouth on each side. It doesn't do any good once it's swallowed. If the patches of thrush in the mouth don't start improving in 2  days, rub the nystatin directly on the patches. Use a cotton swab or a gauze wrapped on your finger. Keep this up for at least 7 days, or until all the thrush has been gone for 3 days.If you are breast-feeding, apply nystatin to any irritated areas on your nipples.   Decrease sucking time during feedingIf sucking on a nipple is painful for your child, temporarily use a cup and spoon. In any case, while your child has thrush, reduce sucking time to 20 minutes or less per feeding.If the thrush comes back after treatment and your child is bottle-fed, switch to a nipple with a different shape and made from silicone.   Restrict pacifier useWhile your child has thrush don't give him a pacifier, except when it's really needed to calm your baby. If your infant is using an orthodontic-type pacifier, switch to a smaller, regular one. Special washing or boiling of bottle nipples or pacifiers is not necessary or helpful.   Diaper rash associated with thrushIf your child has a diaper rash as well as thrush, assume the rash is caused by yeast. Buy Lotrimin cream at your local pharmacy and put it on your baby's bottom 4 times a day.  When should I call my child's healthcare provider?  Call during office hours if:  Your child refuses to drink.   The thrush gets worse during treatment.   The thrush lasts beyond 10 days despite treatment.   You have other concerns or questions.    Published by [employer].  This content is reviewed periodically and is subject to change as new health information becomes available. The information is intended to inform and educate and is not a replacement for medical evaluation, advice, diagnosis or treatment by a healthcare professional.              Follow-ups after your visit        Who to contact     If you have questions or need follow up information about today's clinic visit or your schedule please contact [facility] directly at [phone].  Normal or non-critical lab and  imaging results will be communicated to you by [employer], letter or phone within 4 business days after the clinic has received the results. If you do not hear from us within 7 days, please contact the clinic through [employer] or phone. If you have a critical or abnormal lab result, we will notify you by phone as soon as possible.  Submit refill requests through [employer] or call your pharmacy and they will forward the refill request to us. Please allow 3 business days for your refill to be completed.          Additional Information About Your Visit        [employer] Information     [employer] gives you secure access to your electronic health record. If you see a primary care provider, you can also send messages to your care team and make appointments. If you have questions, please call your primary care clinic.  If you do not have a primary care provider, please call [phone] and they will assist you.        Care EveryWhere ID     This is your Care EveryWhere ID. This could be used by other organizations to access your [facility] medical records  [id number]        Your Vitals Were     Pulse Temperature Last Period Pulse Oximetry BMI (Body Mass Index)       84 97.8  F (36.6  C) (Oral) 11/02/2017 98% 24 kg/m2        Blood Pressure from Last 3 Encounters:   11/03/17 120/80   10/12/17 99/65   10/20/16 99/67    Weight from Last 3 Encounters:   11/03/17 127 lb (57.6 kg)   10/12/17 127 lb (57.6 kg)   10/20/16 125 lb (56.7 kg)              We Performed the Following     KOH prep (Other than skin, nails, hair)          Today's Medication Changes          These changes are accurate as of: 11/3/17  6:27 PM.  If you have any questions, ask your nurse or doctor.               Start taking these medicines.        Dose/Directions    clotrimazole 10 MG chhaya   Used for:  Thrush   Started by:  Ghazala Landry PA-C        Dose:  1 Chhaya   Place 1 Chhaya (10 mg) inside cheek 5 times daily for 14 days   Quantity:  70 Chhaya   Refills:   0            Where to get your medicines      These medications were sent to [id number] - [facility] [address] [facility]  [address] [address] [facility], [place] [address]     Phone:  [phone]     clotrimazole 10 MG david                Primary Care Provider Office Phone # Fax #    Melva Walden [phone] [address] [facility] [address]        Equal Access to Services     [facility] HospitalJOHN : Hadii aad ku hadasho Soomaali, waaxda luqadaha, qaybta kaalmada adeegyada, waxay idiin hayaan adeeg kharash la'tristinn . So [facility] [place] [phone].    ATENCIÓN: Si habla español, tiene a العراقي disposición servicios gratuitos de asistencia lingüística. [facility] [phone].    We comply with applicable federal civil rights laws and [place] laws. We do not discriminate on the basis of race, color, national origin, age, disability, sex, sexual orientation, or gender identity.            Thank you!     Thank you for choosing [facility]  for your care. Our goal is always to provide you with excellent care. Hearing back from our patients is one way we can continue to improve our services. Please take a few minutes to complete the written survey that you may receive in the mail after your visit with us. Thank you!             Your Updated Medication List - Protect others around you: Learn how to safely use, store and throw away your medicines at www.disposemymeds.org.          This list is accurate as of: 11/3/17  6:27 PM.  Always use your most recent med list.                   Brand Name Dispense Instructions for use Diagnosis    clotrimazole 10 MG david     70 David    Place 1 David (10 mg) inside cheek 5 times daily for 14 days    Thrush       metroNIDAZOLE 500 MG tablet    FLAGYL     Take 500 mg by mouth    Coated tongue       rizatriptan 10 MG tablet    MAXALT    18 tablet    TAKE ONE TABLET BY MOUTH AT ONSET OF HEADACHE FOR MIGRAINE. MAY REPEAT DOSE IN 2 HOURS. DO NOT TAKE MORE THAN 3  TABLETS IN 24 HOURS.    Migraine without aura and without status migrainosus, not intractable       topiramate 50 MG tablet    TOPAMAX    180 tablet    Take 1 tablet (50 mg) by mouth 2 times daily    Migraine without aura and without status migrainosus, not intractable

## 2017-11-03 NOTE — NURSING NOTE
"Marie Lal is a 37 year old female.      Chief Complaint   Patient presents with     Urgent Care     Mouth Problem     pt was seen at the urgency room on Monday am and was told she had colotis and was given Metronidazole 500mg and now has mouth issues ? thrush - white stuff in her mouth started today       Initial /80 (BP Location: Right arm, Cuff Size: Adult Regular)  Pulse 84  Temp 97.8  F (36.6  C) (Oral)  Wt 127 lb (57.6 kg)  LMP 11/02/2017  SpO2 98%  BMI 24 kg/m2 Estimated body mass index is 24 kg/(m^2) as calculated from the following:    Height as of 10/12/17: 5' 1\" (1.549 m).    Weight as of this encounter: 127 lb (57.6 kg).  Medication Reconciliation: complete      Questioned patient about current smoking habits.  Pt. quit smoking some time ago.      Maryse Macias CMA      "

## 2017-11-27 ENCOUNTER — OFFICE VISIT (OUTPATIENT)
Dept: FAMILY MEDICINE | Facility: CLINIC | Age: 37
End: 2017-11-27
Payer: COMMERCIAL

## 2017-11-27 VITALS
WEIGHT: 124 LBS | BODY MASS INDEX: 23.41 KG/M2 | TEMPERATURE: 98.4 F | HEART RATE: 78 BPM | SYSTOLIC BLOOD PRESSURE: 100 MMHG | RESPIRATION RATE: 14 BRPM | HEIGHT: 61 IN | DIASTOLIC BLOOD PRESSURE: 62 MMHG

## 2017-11-27 DIAGNOSIS — Z01.818 PREOP GENERAL PHYSICAL EXAM: Primary | ICD-10-CM

## 2017-11-27 LAB
ERYTHROCYTE [DISTWIDTH] IN BLOOD BY AUTOMATED COUNT: 12.9 % (ref 10–15)
HCT VFR BLD AUTO: 38.6 % (ref 35–47)
HGB BLD-MCNC: 12.7 G/DL (ref 11.7–15.7)
MCH RBC QN AUTO: 30.8 PG (ref 26.5–33)
MCHC RBC AUTO-ENTMCNC: 32.9 G/DL (ref 31.5–36.5)
MCV RBC AUTO: 94 FL (ref 78–100)
PLATELET # BLD AUTO: 195 10E9/L (ref 150–450)
RBC # BLD AUTO: 4.12 10E12/L (ref 3.8–5.2)
WBC # BLD AUTO: 9.4 10E9/L (ref 4–11)

## 2017-11-27 PROCEDURE — 85027 COMPLETE CBC AUTOMATED: CPT | Performed by: FAMILY MEDICINE

## 2017-11-27 PROCEDURE — 99214 OFFICE O/P EST MOD 30 MIN: CPT | Performed by: FAMILY MEDICINE

## 2017-11-27 PROCEDURE — 36415 COLL VENOUS BLD VENIPUNCTURE: CPT | Performed by: FAMILY MEDICINE

## 2017-11-27 NOTE — MR AVS SNAPSHOT
After Visit Summary   11/27/2017    Marie Lal    MRN: [id number]           Patient Information     Date Of Birth          1980        Visit Information        Provider Department      11/27/2017 3:30 PM Melva Walden MD [facility]        Today's Diagnoses     Preop general physical exam    -  1      Care Instructions      Before Your Surgery      Call your surgeon if there is any change in your health. This includes signs of a cold or flu (such as a sore throat, runny nose, cough, rash or fever).    Do not smoke, drink alcohol or take over the counter medicine (unless your surgeon or primary care doctor tells you to) for the 24 hours before and after surgery.    If you take prescribed drugs: Follow your doctor s orders about which medicines to take and which to stop until after surgery.    Eating and drinking prior to surgery: follow the instructions from your surgeon    Take a shower or bath the night before surgery. Use the soap your surgeon gave you to gently clean your skin. If you do not have soap from your surgeon, use your regular soap. Do not shave or scrub the surgery site.  Wear clean pajamas and have clean sheets on your bed.           Follow-ups after your visit        Who to contact     If you have questions or need follow up information about today's clinic visit or your schedule please contact [facility] directly at [phone].  Normal or non-critical lab and imaging results will be communicated to you by MyChart, letter or phone within 4 business days after the clinic has received the results. If you do not hear from us within 7 days, please contact the clinic through MyChart or phone. If you have a critical or abnormal lab result, we will notify you by phone as soon as possible.  Submit refill requests through [employer] or call your pharmacy and they will forward the refill request to us. Please allow 3 business days for your refill to be  "completed.          Additional Information About Your Visit        [employer] Information     [employer] gives you secure access to your electronic health record. If you see a primary care provider, you can also send messages to your care team and make appointments. If you have questions, please call your primary care clinic.  If you do not have a primary care provider, please call [phone] and they will assist you.        Care EveryWhere ID     This is your Care EveryWhere ID. This could be used by other organizations to access your [place] medical records  [id number]        Your Vitals Were     Pulse Temperature Respirations Height Last Period Breastfeeding?    78 98.4  F (36.9  C) (Oral) 14 5' 1\" (1.549 m) 11/25/2017 No    BMI (Body Mass Index)                   23.43 kg/m2            Blood Pressure from Last 3 Encounters:   11/27/17 100/62   11/03/17 120/80   10/12/17 99/65    Weight from Last 3 Encounters:   11/27/17 124 lb (56.2 kg)   11/03/17 127 lb (57.6 kg)   10/12/17 127 lb (57.6 kg)              We Performed the Following     CBC with platelets        Primary Care Provider Office Phone # Fax #    Melva Walden [phone] [address] [facility] [address]        Equal Access to Services     RADHA JAMISON : Hadii winsome burleson hadasho Soomaali, waaxda luqadaha, qaybta kaalmada adeegyada, meri pimentel. So [facility] [phone].    ATENCIÓN: Si habla español, tiene a العراقي disposición servicios gratuitos de asistencia lingüística. Llame al [phone].    We comply with applicable federal civil rights laws and [place] laws. We do not discriminate on the basis of race, color, national origin, age, disability, sex, sexual orientation, or gender identity.            Thank you!     Thank you for choosing [facility]  for your care. Our goal is always to provide you with excellent care. Hearing back from our patients is one way we can continue to " improve our services. Please take a few minutes to complete the written survey that you may receive in the mail after your visit with us. Thank you!             Your Updated Medication List - Protect others around you: Learn how to safely use, store and throw away your medicines at www.disposemymeds.org.          This list is accurate as of: 11/27/17  3:46 PM.  Always use your most recent med list.                   Brand Name Dispense Instructions for use Diagnosis    rizatriptan 10 MG tablet    MAXALT    18 tablet    TAKE ONE TABLET BY MOUTH AT ONSET OF HEADACHE FOR MIGRAINE. MAY REPEAT DOSE IN 2 HOURS. DO NOT TAKE MORE THAN 3 TABLETS IN 24 HOURS.    Migraine without aura and without status migrainosus, not intractable       topiramate 50 MG tablet    TOPAMAX    180 tablet    Take 1 tablet (50 mg) by mouth 2 times daily    Migraine without aura and without status migrainosus, not intractable

## 2017-11-27 NOTE — NURSING NOTE
"Chief Complaint   Patient presents with     Pre-Op Exam     12/1/17       Initial /62 (BP Location: Right arm, Patient Position: Chair, Cuff Size: Adult Regular)  Pulse 78  Temp 98.4  F (36.9  C) (Oral)  Resp 14  Ht 5' 1\" (1.549 m)  Wt 124 lb (56.2 kg)  LMP 11/25/2017  Breastfeeding? No  BMI 23.43 kg/m2 Estimated body mass index is 23.43 kg/(m^2) as calculated from the following:    Height as of this encounter: 5' 1\" (1.549 m).    Weight as of this encounter: 124 lb (56.2 kg).  Medication Reconciliation: complete rt arm Shaina Augustin MA      "

## 2017-11-27 NOTE — PROGRESS NOTES
[facility]  [address] [facility] [address]  [phone]  Dept: [phone]    PRE-OP EVALUATION:  Today's date: 2017    Marie Lal (: 1980) presents for pre-operative evaluation assessment as requested by Dr. Olivas .  She requires evaluation and anesthesia risk assessment prior to undergoing surgery/procedure for treatment of Sinus Surgery  .  Proposed procedure: Sinus Surgery    Date of Surgery/ Procedure: 17  Time of Surgery/ Procedure: not sure  Hospital/Surgical Facility: [facility]   Fax number for surgical facility: [phone]  Primary Physician: Melva Walden  Type of Anesthesia Anticipated: General    Patient has a Health Care Directive or Living Will:  NO    1. NO - Do you have a history of heart attack, stroke, stent, bypass or surgery on an artery in the head, neck, heart or legs?  2. NO - Do you ever have any pain or discomfort in your chest?  3. NO - Do you have a history of  Heart Failure?  4. NO - Are you troubled by shortness of breath when: walking on the level, up a slight hill or at night?  5. NO - Do you currently have a cold, bronchitis or other respiratory infection?  6. NO - Do you have a cough, shortness of breath or wheezing?  7. NO - Do you sometimes get pains in the calves of your legs when you walk?  8. NO - Do you or anyone in your family have previous history of blood clots?  9. NO - Do you or does anyone in your family have a serious bleeding problem such as prolonged bleeding following surgeries or cuts?  10. NO - Have you ever had problems with anemia or been told to take iron pills?  11. NO - Have you had any abnormal blood loss such as black, tarry or bloody stools, or abnormal vaginal bleeding?  12. NO - Have you ever had a blood transfusion?  13. NO - Have you or any of your relatives ever had problems with anesthesia?  14. NO - Do you have sleep apnea, excessive snoring or daytime drowsiness?  15.  NO - Do you have any prosthetic heart valves?  16. NO - Do you have prosthetic joints?  17. NO - Is there any chance that you may be pregnant?              HPI:                                                      Brief HPI related to upcoming procedure: sinus surgery related to chronic sinusitis and headache.      See problem list for active medical problems.  Problems all longstanding and stable, except as noted/documented.  See ROS for pertinent symptoms related to these conditions.                                                                                                  .    MEDICAL HISTORY:                                                    Patient Active Problem List    Diagnosis Date Noted     Migraine headache 2011     Priority: Medium     (Problem list name updated by automated process. Provider to review and confirm.)       CARDIOVASCULAR SCREENING; LDL GOAL LESS THAN 160 10/31/2010     Priority: Medium      Past Medical History:   Diagnosis Date     Epilepsy seizure, generalized, convulsive (H)     resolved, last took med 2006     Menarche 15     Migraine     rare, takes motrin, 1 per month at most, contolled     Seizures (H)     None for 10 years - no medications     Shingles 30    right buttock     Varicella 9     Past Surgical History:   Procedure Laterality Date      SECTION        SECTION  2013    Procedure:  SECTION;   SECTION ;  Surgeon: Joey Chirinos MD;  Location:  L+D     Current Outpatient Prescriptions   Medication Sig Dispense Refill     rizatriptan (MAXALT) 10 MG tablet TAKE ONE TABLET BY MOUTH AT ONSET OF HEADACHE FOR MIGRAINE. MAY REPEAT DOSE IN 2 HOURS. DO NOT TAKE MORE THAN 3 TABLETS IN 24 HOURS. 18 tablet 0     topiramate (TOPAMAX) 50 MG tablet Take 1 tablet (50 mg) by mouth 2 times daily 180 tablet 3     OTC products: NSAIDS    Allergies   Allergen Reactions     Penicillins Hives     Childhood only.  Has likely had  "cephalosporin later in life      Latex Allergy: NO    Social History   Substance Use Topics     Smoking status: Former Smoker     Years: 1.00     Quit date: 1/1/2013     Smokeless tobacco: Never Used      Comment: quit with diagnosis of pregnancy     Alcohol use No     History   Drug Use No       REVIEW OF SYSTEMS:                                                    C: NEGATIVE for fever, chills, change in weight  I: NEGATIVE for worrisome rashes, moles or lesions  E: NEGATIVE for vision changes or irritation  ENT/MOUTH: chronic sinus infection.  R: NEGATIVE for significant cough or SOB  B: NEGATIVE for masses, tenderness or discharge  CV: NEGATIVE for chest pain, palpitations or peripheral edema  GI: NEGATIVE for nausea, abdominal pain, heartburn, or change in bowel habits  : NEGATIVE for frequency, dysuria, or hematuria  M: NEGATIVE for significant arthralgias or myalgia  N: NEGATIVE for weakness, dizziness or paresthesias  E: NEGATIVE for temperature intolerance, skin/hair changes  H: NEGATIVE for bleeding problems  P: NEGATIVE for changes in mood or affect    EXAM:                                                     5' 1\" (1.549 m)  LMP 11/02/2017  Breastfeeding? No    GENERAL APPEARANCE: healthy, alert and no distress     EYES: EOMI, PERRL     HENT: TM congested/with minimal fluid collection. Bilateral, nose in congested, throat is clear.     NECK: no adenopathy, no asymmetry, masses, or scars and thyroid normal to palpation     RESP: lungs clear to auscultation - no rales, rhonchi or wheezes     CV: regular rates and rhythm, normal S1 S2, no S3 or S4 and no murmur, click or rub     ABDOMEN:  soft, nontender, no HSM or masses and bowel sounds normal     MS: extremities normal- no gross deformities noted, no evidence of inflammation in joints, FROM in all extremities.     SKIN: no suspicious lesions or rashes     NEURO: Normal strength and tone, sensory exam grossly normal, mentation intact and speech " normal     PSYCH: mentation appears normal. and affect normal/bright     LYMPHATICS: No axillary, cervical, or supraclavicular nodes    DIAGNOSTICS:                                                      Labs Resulted Today:   Results for orders placed or performed in visit on 11/03/17   KOH prep (Other than skin, nails, hair)   Result Value Ref Range    Specimen Description Oral     KOH Prep Fungal elements seen (A)      Labs Drawn and in Process:   Unresulted Labs Ordered in the Past 30 Days of this Admission     No orders found from 9/28/2017 to 11/28/2017.          Recent Labs   Lab Test  09/21/13   0751  09/19/13   1558   02/21/13   1720  05/03/11   1519   HGB  9.1*  11.7   < >  13.0  13.5   PLT   --    --    --   175  174   NA   --    --    --    --   144   POTASSIUM   --    --    --    --   3.9   CR   --    --    --    --   0.85    < > = values in this interval not displayed.        IMPRESSION:                                                    Reason for surgery/procedure:sinus surgery  Diagnosis/reason for consult: pre op    The proposed surgical procedure is considered INTERMEDIATE risk.    REVISED CARDIAC RISK INDEX  The patient has the following serious cardiovascular risks for perioperative complications such as (MI, PE, VFib and 3  AV Block):  No serious cardiac risks  INTERPRETATION: 0 risks: Class I (very low risk - 0.4% complication rate)    The patient has the following additional risks for perioperative complications:  No identified additional risks    No diagnosis found.    RECOMMENDATIONS:                                                              APPROVAL GIVEN to proceed with proposed procedure, without further diagnostic evaluation       Signed Electronically by: Melva Walden MD    Copy of this evaluation report is provided to requesting physician.    [facility] Preop Guidelines

## 2017-12-01 ENCOUNTER — HOSPITAL PATHOLOGY (OUTPATIENT)
Dept: OTHER | Facility: CLINIC | Age: 37
End: 2017-12-01

## 2017-12-04 LAB — COPATH REPORT: NORMAL

## 2018-01-17 DIAGNOSIS — G43.009 MIGRAINE WITHOUT AURA AND WITHOUT STATUS MIGRAINOSUS, NOT INTRACTABLE: ICD-10-CM

## 2018-01-17 NOTE — TELEPHONE ENCOUNTER
Requested Prescriptions   Pending Prescriptions Disp Refills     rizatriptan (MAXALT) 10 MG tablet 18 tablet 0     Sig: TAKE ONE TABLET BY MOUTH AT ONSET OF HEADACHE FOR MIGRAINE. MAY REPEAT DOSE IN 2 HOURS. DO NOT TAKE MORE THAN 3 TABLETS IN 24 HOURS.    There is no refill protocol information for this order

## 2018-01-18 RX ORDER — RIZATRIPTAN BENZOATE 10 MG/1
TABLET ORAL
Qty: 18 TABLET | Refills: 1 | Status: SHIPPED | OUTPATIENT
Start: 2018-01-18 | End: 2018-04-12

## 2018-01-18 NOTE — TELEPHONE ENCOUNTER
"Requested Prescriptions   Pending Prescriptions Disp Refills     rizatriptan (MAXALT) 10 MG tablet 18 tablet 0     Sig: TAKE ONE TABLET BY MOUTH AT ONSET OF HEADACHE FOR MIGRAINE. MAY REPEAT DOSE IN 2 HOURS. DO NOT TAKE MORE THAN 3 TABLETS IN 24 HOURS.    Serotonin Agonists Failed    1/17/2018 10:25 AM       Failed - Serotonin Agonist request needs review.    Please review patient's record. If patient has had 8 or more treatments in the past month, please forward to provider.         Passed - Blood pressure under 140/90    BP Readings from Last 3 Encounters:   11/27/17 100/62   11/03/17 120/80   10/12/17 99/65                Passed - Recent or future visit with authorizing provider's specialty    Patient had office visit in the last year or has a visit in the next 30 days with authorizing provider.  See \"Patient Info\" tab in inbasket, or \"Choose Columns\" in Meds & Orders section of the refill encounter.            Passed - Patient is age 18 or older       Passed - No active pregnancy on record       Passed - No positive pregnancy test in past 12 months        Last OV: 11.27.17  Last RX: 10.12.17    Prescription approved per [facility] Refill Protocol  Akosua Stallings RN BS    "

## 2018-02-22 ENCOUNTER — VIRTUAL VISIT (OUTPATIENT)
Dept: LAB | Facility: CLINIC | Age: 38
End: 2018-02-22

## 2018-04-12 ENCOUNTER — TELEPHONE (OUTPATIENT)
Dept: FAMILY MEDICINE | Facility: CLINIC | Age: 38
End: 2018-04-12

## 2018-04-12 ENCOUNTER — OFFICE VISIT (OUTPATIENT)
Dept: FAMILY MEDICINE | Facility: CLINIC | Age: 38
End: 2018-04-12
Payer: COMMERCIAL

## 2018-04-12 VITALS
DIASTOLIC BLOOD PRESSURE: 59 MMHG | WEIGHT: 126 LBS | TEMPERATURE: 98.8 F | BODY MASS INDEX: 23.79 KG/M2 | HEIGHT: 61 IN | HEART RATE: 74 BPM | SYSTOLIC BLOOD PRESSURE: 94 MMHG | RESPIRATION RATE: 16 BRPM | OXYGEN SATURATION: 98 %

## 2018-04-12 DIAGNOSIS — G43.009 MIGRAINE WITHOUT AURA AND WITHOUT STATUS MIGRAINOSUS, NOT INTRACTABLE: ICD-10-CM

## 2018-04-12 PROCEDURE — 99214 OFFICE O/P EST MOD 30 MIN: CPT | Performed by: FAMILY MEDICINE

## 2018-04-12 RX ORDER — RIZATRIPTAN BENZOATE 10 MG/1
TABLET ORAL
Qty: 60 TABLET | Refills: 3 | Status: SHIPPED | OUTPATIENT
Start: 2018-04-12 | End: 2019-04-12

## 2018-04-12 RX ORDER — RIZATRIPTAN BENZOATE 10 MG/1
TABLET ORAL
Qty: 18 TABLET | Refills: 1 | Status: SHIPPED | OUTPATIENT
Start: 2018-04-12 | End: 2018-04-12

## 2018-04-12 RX ORDER — TOPIRAMATE 50 MG/1
100 TABLET, FILM COATED ORAL 2 TIMES DAILY
Qty: 360 TABLET | Refills: 3 | Status: SHIPPED | OUTPATIENT
Start: 2018-04-12 | End: 2019-04-12

## 2018-04-12 NOTE — PROGRESS NOTES
SUBJECTIVE:   Marie Lal is a 37 year old female who presents to clinic today for the following health issues:      Headache  Onset: 3 days ago    Description:   Location: unilateral in the left temporal area   Character: throbbing pain  Frequency:  Everyday for last 3 days  Duration:  3 days    Intensity: moderate, severe    Progression of Symptoms:  same    Accompanying Signs & Symptoms:  Stiff neck: no  Neck or upper back pain: no  Fever: no  Sinus pressure: no  Nausea or vomiting: no  Dizziness: no  Numbness: no  Weakness: no  Visual changes: no    History:   Head trauma: no  Family history of migraines: YES  Previous tests for headaches: no  Neurologist evaluations: YES  Able to do daily activities: YES  Wake with a headaches: YES  Do headaches wake you up: YES  Daily pain medication use: no  Work/school stressors/changes: no    Precipitating factors:   Does light make it worse: YES  Does sound make it worse: no    Alleviating factors:  Does sleep help: YES    Therapies Tried and outcome: Maxalt that hsas dulled it down, but she feels the migraine is getting worse again.  They are getting about 2 to 3 times a month.      Problem list and histories reviewed & adjusted, as indicated.  Additional history: as documented    Patient Active Problem List   Diagnosis     CARDIOVASCULAR SCREENING; LDL GOAL LESS THAN 160     Migraine headache     Past Surgical History:   Procedure Laterality Date      SECTION        SECTION  2013    Procedure:  SECTION;   SECTION ;  Surgeon: Joey Chirinos MD;  Location:  [facility]       Social History   Substance Use Topics     Smoking status: Former Smoker     Years: 1.00     Quit date: 2013     Smokeless tobacco: Never Used      Comment: quit with diagnosis of pregnancy     Alcohol use No     Family History   Problem Relation Age of Onset     Hypertension Mother      CANCER Maternal Grandfather      Bone         Current  "Outpatient Prescriptions   Medication Sig Dispense Refill     rizatriptan (MAXALT) 10 MG tablet TAKE ONE TABLET BY MOUTH AT ONSET OF HEADACHE FOR MIGRAINE. MAY REPEAT DOSE IN 2 HOURS. DO NOT TAKE MORE THAN 2 TABLETS IN 24 HOURS. 60 tablet 3     topiramate (TOPAMAX) 50 MG tablet Take 2 tablets (100 mg) by mouth 2 times daily 360 tablet 3     [DISCONTINUED] topiramate (TOPAMAX) 50 MG tablet Take 1 tablet (50 mg) by mouth 2 times daily 180 tablet 3       Reviewed and updated as needed this visit by clinical staff  Tobacco  Allergies  Meds  Med Hx  Surg Hx  Fam Hx  Soc Hx      Reviewed and updated as needed this visit by Provider         ROS:  CONSTITUTIONAL: NEGATIVE for fever, chills, change in weight  NEURO: NEGATIVE for weakness, dizziness or paresthesias    OBJECTIVE:     BP 94/59 (BP Location: Right arm, Patient Position: Chair, Cuff Size: Adult Regular)  Pulse 74  Temp 98.8  F (37.1  C) (Oral)  Resp 16  Ht 5' 1\" (1.549 m)  Wt 126 lb (57.2 kg)  SpO2 98%  BMI 23.81 kg/m2  Body mass index is 23.81 kg/(m^2).  GENERAL: healthy, alert and no distress  NEURO: Normal strength and tone, sensory exam grossly normal, mentation intact, cranial nerves 2-12 intact, DTR's normal and symmetric  and gait normal        ASSESSMENT/PLAN:             1. Migraine without aura and without status migrainosus, not intractable  Not controlled, increase topamax to 100 mg twice daily, may use maxalt along with motrin 800 mg on the onset of such symptoms.  - rizatriptan (MAXALT) 10 MG tablet; TAKE ONE TABLET BY MOUTH AT ONSET OF HEADACHE FOR MIGRAINE. MAY REPEAT DOSE IN 2 HOURS. DO NOT TAKE MORE THAN 2 TABLETS IN 24 HOURS.  Dispense: 60 tablet; Refill: 3  - topiramate (TOPAMAX) 50 MG tablet; Take 2 tablets (100 mg) by mouth 2 times daily  Dispense: 360 tablet; Refill: 3    FUTURE APPOINTMENTS:       - Follow-up visit in 1 month if no improvement.    Melva Walden MD  [facility]"

## 2018-04-12 NOTE — PATIENT INSTRUCTIONS
Please take Maxalt 10 mg 1 tab, along with Motrin (200 mg) 4 tabs at the same time, as soon as your headache begins.  Increase your dose of topamax to 100 mg twice daily (50 mg 2 tabs twice daily) for 1 to 2 months, then you can cut the dose down to 50 mg 1 tab twice daily if your headaches are under control.

## 2018-04-12 NOTE — MR AVS SNAPSHOT
After Visit Summary   4/12/2018    Marie Lal    MRN: [id number]           Patient Information     Date Of Birth          1980        Visit Information        Provider Department      4/12/2018 5:30 PM Melva Walden MD [facility]        Today's Diagnoses     Migraine without aura and without status migrainosus, not intractable          Care Instructions    Please take Maxalt 10 mg 1 tab, along with Motrin (200 mg) 4 tabs at the same time, as soon as your headache begins.  Increase your dose of topamax to 100 mg twice daily (50 mg 2 tabs twice daily) for 1 to 2 months, then you can cut the dose down to 50 mg 1 tab twice daily if your headaches are under control.            Follow-ups after your visit        Follow-up notes from your care team     Return in about 1 week (around 4/19/2018), or if symptoms worsen or fail to improve.      Who to contact     If you have questions or need follow up information about today's clinic visit or your schedule please contact [facility] directly at [phone].  Normal or non-critical lab and imaging results will be communicated to you by G[employer] Greenhart, letter or phone within 4 business days after the clinic has received the results. If you do not hear from us within 7 days, please contact the clinic through [employer] or phone. If you have a critical or abnormal lab result, we will notify you by phone as soon as possible.  Submit refill requests through Skillz or call your pharmacy and they will forward the refill request to us. Please allow 3 business days for your refill to be completed.          Additional Information About Your Visit        MyChart Information     Sk[employer]z gives you secure access to your electronic health record. If you see a primary care provider, you can also send messages to your care team and make appointments. If you have questions, please call your primary care clinic.  If you do not have a primary  "care provider, please call [phone] and they will assist you.        Care EveryWhere ID     This is your Care EveryWhere ID. This could be used by other organizations to access your [place] medical records  [id number]        Your Vitals Were     Pulse Temperature Respirations Height Pulse Oximetry BMI (Body Mass Index)    74 98.8  F (37.1  C) (Oral) 16 5' 1\" (1.549 m) 98% 23.81 kg/m2       Blood Pressure from Last 3 Encounters:   04/12/18 94/59   11/27/17 100/62   11/03/17 120/80    Weight from Last 3 Encounters:   04/12/18 126 lb (57.2 kg)   11/27/17 124 lb (56.2 kg)   11/03/17 127 lb (57.6 kg)              Today, you had the following     No orders found for display         Today's Medication Changes          These changes are accurate as of 4/12/18  5:46 PM.  If you have any questions, ask your nurse or doctor.               Start taking these medicines.        Dose/Directions    rizatriptan 10 MG tablet   Commonly known as:  MAXALT   Used for:  Migraine without aura and without status migrainosus, not intractable   Started by:  Melva Walden MD        TAKE ONE TABLET BY MOUTH AT ONSET OF HEADACHE FOR MIGRAINE. MAY REPEAT DOSE IN 2 HOURS. DO NOT TAKE MORE THAN 2 TABLETS IN 24 HOURS.   Quantity:  60 tablet   Refills:  3         These medicines have changed or have updated prescriptions.        Dose/Directions    topiramate 50 MG tablet   Commonly known as:  TOPAMAX   This may have changed:  how much to take   Used for:  Migraine without aura and without status migrainosus, not intractable   Changed by:  Melva Walden MD        Dose:  100 mg   Take 2 tablets (100 mg) by mouth 2 times daily   Quantity:  360 tablet   Refills:  3            Where to get your medicines      These medications were sent to [facility] [address] [place] [address] [facility]  [address] [facility] [address]     Phone:  [phone]     rizatriptan 10 MG tablet    topiramate 50 MG tablet                Primary " Care Provider Office Phone # Fax #    Melva Walden [phone] [address] [facility] [address]        Equal Access to Services     KENNETHBERNARD SHAHEEN : Hadagnieszka winsome burleson hadiliro Solronaali, waaxda luqadaha, qaybta kaalmada adeasyayada, meri hernandez laAlenataylor pimentel. So [facility] [phone].    ATENCIÓN: Si habla español, tiene a العراقي disposición servicios gratuitos de asistencia lingüística. Llame al [phone].    We comply with applicable federal civil rights laws and [place] laws. We do not discriminate on the basis of race, color, national origin, age, disability, sex, sexual orientation, or gender identity.            Thank you!     Thank you for choosing [facility]  for your care. Our goal is always to provide you with excellent care. Hearing back from our patients is one way we can continue to improve our services. Please take a few minutes to complete the written survey that you may receive in the mail after your visit with us. Thank you!             Your Updated Medication List - Protect others around you: Learn how to safely use, store and throw away your medicines at www.disposemymeds.org.          This list is accurate as of 4/12/18  5:46 PM.  Always use your most recent med list.                   Brand Name Dispense Instructions for use Diagnosis    rizatriptan 10 MG tablet    MAXALT    60 tablet    TAKE ONE TABLET BY MOUTH AT ONSET OF HEADACHE FOR MIGRAINE. MAY REPEAT DOSE IN 2 HOURS. DO NOT TAKE MORE THAN 2 TABLETS IN 24 HOURS.    Migraine without aura and without status migrainosus, not intractable       topiramate 50 MG tablet    TOPAMAX    360 tablet    Take 2 tablets (100 mg) by mouth 2 times daily    Migraine without aura and without status migrainosus, not intractable

## 2018-04-12 NOTE — TELEPHONE ENCOUNTER
Last OV: 11.27.17  Last fill: 3.9.18     Disp Refills Start End JAH   rizatriptan (MAXALT) 10 MG tablet 18 tablet 1 1/18/2018  No   Sig: TAKE ONE TABLET BY MOUTH AT ONSET OF HEADACHE FOR MIGRAINE. MAY REPEAT DOSE IN 2 HOURS. DO NOT TAKE MORE THAN 3 TABLETS IN 24 HOURS.   Class: E-Prescribe     LM to CB     Pt called right back  Taking 3-4 pills each time she gets HA, so a 3 month supply doesn't last 3 months  Gets HA 3-4 x/month  Hoping to increase dosing or # of tablets    Route to provider to review and advise    Akosua Stallings RN Nurse Triage

## 2018-04-12 NOTE — TELEPHONE ENCOUNTER
Reason for Call:  Medication or medication refill: Patient requests dosage change    Do you use a [facility]?  Name of the pharmacy and phone number for the current request: [email or web address]  Name of the medication requested: RIZATRIPTAN    Other request: Patient calling, wants to increase dosage    Can we leave a detailed message on this number? YES    Phone number patient can be reached at: Cell number on file:  [phone]  Telephone Information:           Best Time: any    Call taken on 4/12/2018 at 8:05 AM by Ruth Behrens

## 2018-04-12 NOTE — TELEPHONE ENCOUNTER
Actually, she shouldn't be taking the medicine more than 2 tabs in 24 hours, the recommendations is one tabs at the onset of headache, repeat in 2 hours if no relief, then that's it.  If the headaches are not controlled that way, or is she is using it more than 2 times a week, then I recommend OV to discuss her migraines.  Melva Walden MD  [facility]  [phone]

## 2018-06-20 NOTE — PROGRESS NOTES
"Date:   Clinician: Heather Childs  Clinician NPI: [id number]  Patient: Marie Lal  Patient : 1980  Patient Address: [address] [place], [place] [address]  Patient Phone: [phone]  Visit Protocol: URI  Patient Summary:  Marie is a 37 year old ( : 1980 ) female who initiated a Visit for cold, sinus infection, or influenza. When asked the question \"Please sign me up to receive news, health information and promotions. \", Marie responded \"No\".   A synchronous visit is necessary because the patient reported the following abnormal symptoms:   Recent facial or sinus surgery (requires clarification)   Marie states her symptoms started suddenly 3-6 days ago.   Her symptoms consist of enlarged lymph nodes and a sore throat.   Symptom details   Sore throat: Marie reports having severe throat pain (between 7-10 on a 10 point pain scale), has exudate on her tonsils, and is able to swallow liquids. The lymph nodes in her neck are enlarged. She states that rashes have not appeared on the skin since the sore throat started.    Marie denies having myalgias, teeth pain, rhinitis, malaise, headache, chills, nasal congestion, cough, fever, dyspnea, ear pain, facial pain or pressure, and wheezing. She also denies double sickening (worsening symptoms after initial improvement) and taking antibiotic medication for the symptoms.   Marie is not sure if she has been exposed to someone with strep throat.   Weight: 122 lbs   Marie does not smoke or use smokeless tobacco.   She denies pregnancy and denies breastfeeding. She has menstruated in the past month.  MEDICATIONS:   Patient free text response: Rizatriptan  , ALLERGIES:   penicillin/amoxicillin/augmentin    Clinician Response:  Dear Edelmira Salazar Strep Test    Your initial lab result did not find bacteria that causes strep in your throat. A second test called a throat culture is in progress on the sample you " provided to confirm this result. You will be contacted in 24-48 hours once your results are available.  While waiting for your throat culture results, try the following to stay as comfortable as possible:     Rest    Drink plenty of water and other liquids    Take a hot shower to loosen congestion    Use throat lozenges    Gargle with warm salt water (1/4 teaspoon of salt per 8 ounce glass of water)    Suck on frozen items such as popsicles or ice cubes    Drink hot tea with lemon and honey     Call 911 or go to the emergency room if you suddenly develop a rash, are drooling or unable to swallow fluids, if you are having difficulty breathing, or feel that your throat is closing off.   Diagnosis: Dejah Strep  Diagnosis ICD: J02.0  Triage Notes: She has had a sore throat 3-4 days.  had sinus surgery in Dec-removed polyps and deviated septum.  no hx strep throat.  no fever or cough.  is able to swallow liquids.  pt verified her last name and   Synchronous Triage: phone, status: completed, duration: 183 seconds  Dejah Results: Edelmira Strep Test - Negative: no group a streptococcal antigen detected by immunoassay, await culture result  Dejah Secondary Results: Awaiting

## 2019-04-08 DIAGNOSIS — G43.009 MIGRAINE WITHOUT AURA AND WITHOUT STATUS MIGRAINOSUS, NOT INTRACTABLE: ICD-10-CM

## 2019-04-08 NOTE — TELEPHONE ENCOUNTER
"Requested Prescriptions   Pending Prescriptions Disp Refills     rizatriptan (MAXALT) 10 MG tablet [Pharmacy Med Name: RIZATRIPTAN 10MG TAB] 18 tablet 1     Sig: TAKE 1 TABLET BY MOUTH AT ONSET OF HEADACHE FOR MIGRAINE. MAY REPEAT DOSE IN 2 HOURS DO NOT TAKE MORE THAN 2 TABLETS IN 24 HOURS   Last Written Prescription Date:  4/12/18  Last Fill Quantity: 60,  # refills: 3   Last Office Visit: 4/12/2018 Win      Return in about 1 week (around 4/19/2018), or if symptoms worsen or fail to improve.     Future Office Visit:    Next 5 appointments (look out 90 days)    Apr 18, 2019  5:00 PM CDT  [facility]-Op physical with Mel[facility]den [facility]  [facility] [address] [place] [id number] [phone]             Serotonin Agonists Failed - 4/8/2019 12:16 PM        Failed - Serotonin Agonist request needs review.     Please review patient's record. If patient has had 8 or more treatments in the past month, please forward to provider.          Passed - Blood pressure under 140/90 in past 12 months     BP Readings from Last 3 Encounters:   04/12/18 94/59   11/27/17 100/62   11/03/17 120/80                 Passed - Recent (12 mo) or future (30 days) visit within the authorizing provider's specialty     Patient had office visit in the last 12 months or has a visit in the next 30 days with authorizing provider or within the authorizing provider's specialty.  See \"Patient Info\" tab in inbasket, or \"Choose Columns\" in Meds & Orders section of the refill encounter.              Passed - Medication is active on med list        Passed - Patient is age 18 or older        Passed - No active pregnancy on record        Passed - No positive pregnancy test in past 12 months        "

## 2019-04-10 RX ORDER — RIZATRIPTAN BENZOATE 10 MG/1
TABLET ORAL
Qty: 18 TABLET | Refills: 0 | Status: SHIPPED | OUTPATIENT
Start: 2019-04-10 | End: 2019-04-12

## 2019-04-12 ENCOUNTER — OFFICE VISIT (OUTPATIENT)
Dept: FAMILY MEDICINE | Facility: CLINIC | Age: 39
End: 2019-04-12
Payer: COMMERCIAL

## 2019-04-12 VITALS
OXYGEN SATURATION: 99 % | TEMPERATURE: 98.2 F | HEART RATE: 67 BPM | SYSTOLIC BLOOD PRESSURE: 98 MMHG | WEIGHT: 126 LBS | BODY MASS INDEX: 23.79 KG/M2 | HEIGHT: 61 IN | DIASTOLIC BLOOD PRESSURE: 64 MMHG | RESPIRATION RATE: 16 BRPM

## 2019-04-12 DIAGNOSIS — G43.009 MIGRAINE WITHOUT AURA AND WITHOUT STATUS MIGRAINOSUS, NOT INTRACTABLE: ICD-10-CM

## 2019-04-12 DIAGNOSIS — Z01.818 PREOP GENERAL PHYSICAL EXAM: Primary | ICD-10-CM

## 2019-04-12 LAB
HCG UR QL: NEGATIVE
HGB BLD-MCNC: 12.6 G/DL (ref 11.7–15.7)

## 2019-04-12 PROCEDURE — 81025 URINE PREGNANCY TEST: CPT | Performed by: FAMILY MEDICINE

## 2019-04-12 PROCEDURE — 85018 HEMOGLOBIN: CPT | Performed by: FAMILY MEDICINE

## 2019-04-12 PROCEDURE — 99214 OFFICE O/P EST MOD 30 MIN: CPT | Performed by: FAMILY MEDICINE

## 2019-04-12 PROCEDURE — 36415 COLL VENOUS BLD VENIPUNCTURE: CPT | Performed by: FAMILY MEDICINE

## 2019-04-12 RX ORDER — RIZATRIPTAN BENZOATE 10 MG/1
TABLET ORAL
Qty: 18 TABLET | Refills: 3 | Status: SHIPPED | OUTPATIENT
Start: 2019-04-12 | End: 2019-06-28

## 2019-04-12 ASSESSMENT — MIFFLIN-ST. JEOR: SCORE: 1193.91

## 2019-04-12 NOTE — PROGRESS NOTES
[facility]  [id number] [place] [id number] [phone]  Dept: [phone]    PRE-OP EVALUATION:  Today's date: 2019    Marie Lal (: 1980) presents for pre-operative evaluation assessment as requested by Dr. Hayes.  She requires evaluation and anesthesia risk assessment prior to undergoing surgery/procedure for treatment of breast augmentation .    Fax number for surgical facility: [phone]  Primary Physician: Melva Walden  Type of Anesthesia Anticipated: General    Patient has a Health Care Directive or Living Will:  NO    Preop Questions 2019   1.  Do you have a history of Heart attack, stroke, stent, coronary bypass surgery, or other heart surgery? No   2.  Do you ever have any pain or discomfort in your chest? No   3.  Do you have a history of  Heart Failure? No   4.   Are you troubled by shortness of breath when:  walking on a level surface, or up a slight hill, or at night? No   5.  Do you currently have a cold, bronchitis or other respiratory infection? YES - URI symptoms started 2 days ago.   6.  Do you have a cough, shortness of breath, or wheezing? No   7.  Do you sometimes get pains in the calves of your legs when you walk? No   8. Do you or anyone in your family have previous history of blood clots? No   9.  Do you or does anyone in your family have a serious bleeding problem such as prolonged bleeding following surgeries or cuts? No   10. Have you ever had problems with anemia or been told to take iron pills? No   11. Have you had any abnormal blood loss such as black, tarry or bloody stools, or abnormal vaginal bleeding? No   12. Have you ever had a blood transfusion? No   13. Have you or any of your relatives ever had problems with anesthesia? No   14. Do you have sleep apnea, excessive snoring or daytime drowsiness? No   15. Do you have any prosthetic heart valves? No   16. Do you have prosthetic joints? No   17. Is there any  chance that you may be pregnant? No         HPI:     HPI related to upcoming procedure: breast augmentation.      See problem list for active medical problems.  Problems all longstanding and stable, except as noted/documented.  See ROS for pertinent symptoms related to these conditions.                                                                                                                                                          .    MEDICAL HISTORY:     Patient Active Problem List    Diagnosis Date Noted     Migraine headache 2011     Priority: Medium     (Problem list name updated by automated process. Provider to review and confirm.)       CARDIOVASCULAR SCREENING; LDL GOAL LESS THAN 160 10/31/2010     Priority: Medium      Past Medical History:   Diagnosis Date     Epilepsy seizure, generalized, convulsive (H)     resolved, last took med 2006     Menarche 15     Migraine     rare, takes motrin, 1 per month at most, contolled     Seizures (H)     None for 10 years - no medications     Shingles 30    right buttock     Varicella 9     Past Surgical History:   Procedure Laterality Date      SECTION        SECTION  2013    Procedure:  SECTION;   SECTION ;  Surgeon: Joey Chirinos MD;  Location: [facility]     Current Outpatient Medications   Medication Sig Dispense Refill     rizatriptan (MAXALT) 10 MG tablet TAKE 1 TABLET BY MOUTH AT ONSET OF HEADACHE FOR MIGRAINE. MAY REPEAT DOSE IN 2 HOURS DO NOT TAKE MORE THAN 2 TABLETS IN 24 HOURS 18 tablet 0     OTC products: NSAIDS    Allergies   Allergen Reactions     Penicillins Hives     Childhood only.  Has likely had cephalosporin later in life      Latex Allergy: NO    Social History     Tobacco Use     Smoking status: Former Smoker     Years: 1.00     Last attempt to quit: 2013     Years since quittin.2     Smokeless tobacco: Never Used     Tobacco comment: quit with diagnosis of pregnancy   Substance  "Use Topics     Alcohol use: Yes     Alcohol/week: 0.0 oz     Comment: rarely     History   Drug Use No       REVIEW OF SYSTEMS:   CONSTITUTIONAL: NEGATIVE for fever, chills, change in weight  INTEGUMENTARY/SKIN: NEGATIVE for worrisome rashes, moles or lesions  EYES: NEGATIVE for vision changes or irritation  ENT/MOUTH: runny nose, sore throat, congestion.  RESP: NEGATIVE for significant cough or SOB  BREAST: NEGATIVE for masses, tenderness or discharge  CV: NEGATIVE for chest pain, palpitations or peripheral edema  GI: NEGATIVE for nausea, abdominal pain, heartburn, or change in bowel habits  : NEGATIVE for frequency, dysuria, or hematuria  MUSCULOSKELETAL: NEGATIVE for significant arthralgias or myalgia  NEURO: headache.  ENDOCRINE: NEGATIVE for temperature intolerance, skin/hair changes  HEME: NEGATIVE for bleeding problems  PSYCHIATRIC: NEGATIVE for changes in mood or affect    EXAM:   BP 98/64 (BP Location: Right arm, Patient Position: Chair, Cuff Size: Adult Regular)   Pulse 67   Temp 98.2  F (36.8  C) (Oral)   Resp 16   Ht 1.549 m (5' 1\")   Wt 57.2 kg (126 lb)   SpO2 99%   BMI 23.81 kg/m      GENERAL APPEARANCE: healthy, alert and no distress     EYES: EOMI, PERRL     HENT: ear canals and TM's normal and nose and mouth without ulcers or lesions     NECK: no adenopathy, no asymmetry, masses, or scars and thyroid normal to palpation     RESP: lungs clear to auscultation - no rales, rhonchi or wheezes     CV: regular rates and rhythm, normal S1 S2, no S3 or S4 and no murmur, click or rub     ABDOMEN:  soft, nontender, no HSM or masses and bowel sounds normal     MS: extremities normal- no gross deformities noted, no evidence of inflammation in joints, FROM in all extremities.     SKIN: no suspicious lesions or rashes     NEURO: Normal strength and tone, sensory exam grossly normal, mentation intact and speech normal     PSYCH: mentation appears normal. and affect normal/bright     LYMPHATICS: No cervical " "adenopathy    DIAGNOSTICS:     Labs Resulted Today:   Results for orders placed or performed in visit on 12/01/17   Surgical pathology exam   Result Value Ref Range    Copath Report       Patient Name: MARY ANN FAJARDO  MR#: [id number]  Specimen #: [id number]  Collected: 12/1/2017  Received: 12/1/2017  Reported: 12/4/2017 11:43  Ordering Phy(s): ALMITA SHANE    For improved result formatting, select 'View Enhanced Report Format'  under Linked Documents section.    SPECIMEN(S):  A: Sinus contents, right  B: Sinus contents, left    FINAL DIAGNOSIS:  A and B. Sinus contents, right and left.  - Chronically inflamed sinonasal mucosa with allergic/inflammatory polyp  formation.  Negative for inverted papilloma and malignancy.    Electronically signed out by:    Kenrick Garcia M.D.    CLINICAL HISTORY:  Chronic sinusitis, turbinate hypertrophy, nasal septal deviation.    GROSS:  A:  Received in formalin with proper patient identification, labeled  \"right sinus contents\" are multiple fragments of soft tissue, bone, and  mucus, aggregating to 2 x 1.3 x 0.3 cm. The specimen is submitted  entirely in 1 block, following decalcification.    B:  Received in formalin wit h proper patient identification, labeled  \"left sinus contents\" are multiple fragments of soft tissue, bone, and  mucus, aggregating to 2.5 x 1.4 x 0.3 cm. The specimen is submitted  entirely in 1 block, following decalcification. (Dictated by: Mayra Wong 12/1/2017 02:23 PM)    MICROSCOPIC:  A and B. Microscopic evaluation performed.    CPT Codes:  A: 30754-IH6, 35023-KGO  B: 14813-BL5, 95747-PAU    TESTING LAB LOCATION:  [facility] [address] [place]  [address] [phone]    COLLECTION SITE:  Client: [facility]  Location: [facility] ([facility])       Labs Drawn and in Process:   Unresulted Labs Ordered in the Past 30 Days of this Admission     Date and Time Order Name Status Description    " 4/12/2019 1537 HCG QUALITATIVE URINE In process     4/12/2019 1537 HEMOGLOBIN In process           Recent Labs   Lab Test 11/27/17  1551 09/21/13  0751  02/21/13  1720 05/03/11  1519   HGB 12.7 9.1*   < > 13.0 13.5     --   --  175 174   NA  --   --   --   --  144   POTASSIUM  --   --   --   --  3.9   CR  --   --   --   --  0.85    < > = values in this interval not displayed.        IMPRESSION:   Reason for surgery/procedure: breast augmentation  Diagnosis/reason for consult: pre op    The proposed surgical procedure is considered INTERMEDIATE risk.    REVISED CARDIAC RISK INDEX  The patient has the following serious cardiovascular risks for perioperative complications such as (MI, PE, VFib and 3  AV Block):  No serious cardiac risks  INTERPRETATION: 0 risks: Class I (very low risk - 0.4% complication rate)    The patient has the following additional risks for perioperative complications:  No identified additional risks      ICD-10-CM    1. Preop general physical exam Z01.818    2. Migraine without aura and without status migrainosus, not intractable G43.009        RECOMMENDATIONS:             APPROVAL GIVEN to proceed with proposed procedure, without further diagnostic evaluation       Signed Electronically by: Melva Walden MD    Copy of this evaluation report is provided to requesting physician.    [facility] Preop Guidelines    Revised Cardiac Risk Index

## 2019-05-10 ENCOUNTER — TELEPHONE (OUTPATIENT)
Dept: FAMILY MEDICINE | Facility: CLINIC | Age: 39
End: 2019-05-10

## 2019-05-10 NOTE — TELEPHONE ENCOUNTER
Spoke with pt and she is going to call [facility] and schedule over there.  Makenzie Ellsworth  Team 3 Coordinator

## 2019-05-10 NOTE — TELEPHONE ENCOUNTER
"See \"Scheduling request\" note from patient in basket copied below:    ----- Message -----   From: Marie Lal   Sent: 5/9/2019   1:31 PM   To: [facility] Default Pool   Subject: Appointment Request (HM)                           ----- Message from [facility] sent at 5/9/2019  1:31 PM CDT -----      Appointment Request From: Marie Lal      With Provider: Heather Childs PA-C [[facility]]      Preferred Date Range: From 5/9/2019 To 5/31/2019      Preferred Times: Friday Afternoon      Reason: To address the following health maintenance concerns.   Pap Q3 Yr      Comments:   I d prefer something 5/24 in the afternoon.        Routed to team 3 to reach out to schedule.    Not sure where the [facility] message was supposed to route for scheduling.    Oneida Arguello, RN  [facility]      "

## 2019-06-28 DIAGNOSIS — G43.009 MIGRAINE WITHOUT AURA AND WITHOUT STATUS MIGRAINOSUS, NOT INTRACTABLE: ICD-10-CM

## 2019-06-28 NOTE — TELEPHONE ENCOUNTER
"Requested Prescriptions   Pending Prescriptions Disp Refills     rizatriptan (MAXALT) 10 MG tablet 18 tablet 3     Sig: TAKE 1 TABLET BY MOUTH AT ONSET OF HEADACHE FOR MIGRAINE. MAY REPEAT DOSE IN 2 HOURS DO NOT TAKE MORE THAN 2 TABLETS IN 24 HOURS       Serotonin Agonists Failed - 6/28/2019  1:32 PM        Failed - Serotonin Agonist request needs review.     Please review patient's record. If patient has had 8 or more treatments in the past month, please forward to provider.          Passed - Blood pressure under 140/90 in past 12 months     BP Readings from Last 3 Encounters:   04/12/19 98/64   04/12/18 94/59   11/27/17 100/62                 Passed - Recent (12 mo) or future (30 days) visit within the authorizing provider's specialty     Patient had office visit in the last 12 months or has a visit in the next 30 days with authorizing provider or within the authorizing provider's specialty.  See \"Patient Info\" tab in inbasket, or \"Choose Columns\" in Meds & Orders section of the refill encounter.              Passed - Medication is active on med list        Passed - Patient is age 18 or older        Passed - No active pregnancy on record        Passed - No positive pregnancy test in past 12 months          "

## 2019-07-01 RX ORDER — RIZATRIPTAN BENZOATE 10 MG/1
TABLET ORAL
Qty: 18 TABLET | Refills: 3 | Status: SHIPPED | OUTPATIENT
Start: 2019-07-01 | End: 2019-11-12

## 2019-07-01 NOTE — TELEPHONE ENCOUNTER
Prescription approved per [facility] Refill Protocol.  Lyssa Burns RN

## 2019-07-25 DIAGNOSIS — G43.009 MIGRAINE WITHOUT AURA AND WITHOUT STATUS MIGRAINOSUS, NOT INTRACTABLE: ICD-10-CM

## 2019-07-25 RX ORDER — RIZATRIPTAN BENZOATE 10 MG/1
TABLET ORAL
Qty: 18 TABLET | Refills: 3 | Status: CANCELLED | OUTPATIENT
Start: 2019-07-25

## 2019-07-25 NOTE — TELEPHONE ENCOUNTER
Maxalt    Sent 7/1/2019 with 3 refills supply. Refill not appropriate at this time.     Melinda Lovelace, RN, BSN

## 2019-07-25 NOTE — TELEPHONE ENCOUNTER
"Requested Prescriptions   Pending Prescriptions Disp Refills     rizatriptan (MAXALT) 10 MG tablet 18 tablet 3     Sig: TAKE 1 TABLET BY MOUTH AT ONSET OF HEADACHE FOR MIGRAINE. MAY REPEAT DOSE IN 2 HOURS DO NOT TAKE MORE THAN 2 TABLETS IN 24 HOURS       Serotonin Agonists Failed - 7/25/2019  4:01 PM        Failed - Serotonin Agonist request needs review.     Please review patient's record. If patient has had 8 or more treatments in the past month, please forward to provider.          Passed - Blood pressure under 140/90 in past 12 months     BP Readings from Last 3 Encounters:   04/12/19 98/64   04/12/18 94/59   11/27/17 100/62                 Passed - Recent (12 mo) or future (30 days) visit within the authorizing provider's specialty     Patient had office visit in the last 12 months or has a visit in the next 30 days with authorizing provider or within the authorizing provider's specialty.  See \"Patient Info\" tab in inbasket, or \"Choose Columns\" in Meds & Orders section of the refill encounter.              Passed - Medication is active on med list        Passed - Patient is age 18 or older        Passed - No active pregnancy on record        Passed - No positive pregnancy test in past 12 months          "

## 2019-09-26 ENCOUNTER — VIRTUAL VISIT (OUTPATIENT)
Dept: FAMILY MEDICINE | Facility: OTHER | Age: 39
End: 2019-09-26

## 2019-09-26 NOTE — PROGRESS NOTES
"Date:   Clinician: Ghazala Pineda  Clinician NPI: [id number]  Patient: Marie Lal  Patient : 1980  Patient Address: [address] [place] [address], [place] [id number]  Patient Phone: [phone]  Visit Protocol: Eye conditions  Patient Summary:  Marie is a 39 year old (: 1980 ) female who initiated a Visit for conjunctivitis.  When asked the question \"Please sign me up to receive news, health information and promotions. \", Marie responded \"No\".    Images of her eye condition were uploaded.   Her symptoms started 1-2 days ago and affect the left eye. The symptoms consist of eye redness and drainage coming from the eye(s).   Symptom details   Drainage: The color of the drainage coming out of her eye(s) is yellow. The drainage is thick and causes her eyelids to be stuck shut in the morning.   Denied symptoms include eyelid swelling, light sensitivity, itchy eye(s), eye pain, and bumps on the eyelid. Marie does not have subconjunctival hemorrhage and has not experienced a decrease in vision. She does not feel feverish.   Precipitating events  Marie has recently been exposed to someone with a red eye or an eye infection. In the past 2 weeks, she has had a cold or an ear infection.   Marie has not had a recent diagnosis of conjunctivitis. She also has not had a recent eye surgery, foreign body in the eye(s), and eye injury. She does not wear contact lenses.   Pertinent medical history  Marie has not ever been diagnosed with glaucoma.   Marie is not taking medication to treat her current symptoms.   Marie does not require proof of evaluation of her eye condition before returning to school, work, or .   Marie does not smoke or use smokeless tobacco.   She denies pregnancy and denies breastfeeding. She is currently menstruating.     MEDICATIONS: spironolactone-hydrochlorothiazide oral, rizatriptan oral, ALLERGIES: Penicillins  Clinician Response:  Dear Marie,  " Based on the information provided, you most likely have viral conjunctivitis, more commonly called pink eye. There are no drops or ointments available to treat conjunctivitis caused by a virus. Specifically, antibiotics will not cure a viral infection or make it less contagious.  Medication information  Unless you are allergic to the over-the-counter medication(s) below, I recommend using:  Ketotifen (such as Alaway, Zaditor, or Claritin eye) 0.025% ophthalmic solution. As needed, apply 1 drop in each eye 2 times a day. These eye drops help to reduce the itching of your eyes. However, this medication does not reduce the spread of viruses that can cause eye infections.  Over-the-counter medications do not require a prescription. Ask the pharmacist if you have any questions.  Self care  To reduce the spread of the eye infection, you should not use eye makeup until the infection has fully resolved, and be sure to wash your hands at least once per hour and avoid touching the eyes as much as possible.  The following will reduce the risk for future eye infections:     Frequent handwashing    Replace towels and washcloths daily    Do not share towels and washcloths with others    Replace eye makeup used while eyes were infected    Do not use anyone else's eye makeup     Steps you can take to be as comfortable as possible:     Avoid rubbing your eyes    Apply a cool compress to the eye(s)    Take regular breaks and remember to blink regularly when reading or using a computer for long periods of time    Wear sunglasses when outside    Wear eye protection when swimming or working with chemicals    Use good lighting     When to seek care  Please make an appointment to be seen in a clinic or urgent care if any of the following occurs:     You develop new symptoms or your symptoms becomes worse.    Your symptoms have not improved after a week.      Diagnosis: Viral conjunctivitis  Diagnosis ICD: B30.8

## 2019-11-12 DIAGNOSIS — G43.009 MIGRAINE WITHOUT AURA AND WITHOUT STATUS MIGRAINOSUS, NOT INTRACTABLE: ICD-10-CM

## 2019-11-12 NOTE — TELEPHONE ENCOUNTER
Routing refill request to provider for review/approval because:  Patient has used > 8 tablets/month per [facility] protocol  Makenzie SANTO - Registered Nurse  [facility]  Acute and Diagnostic Services

## 2019-11-12 NOTE — TELEPHONE ENCOUNTER
"Last Written Prescription Date:  7/01/19  Last Fill Quantity: 18 tablet,  # refills: 3   Last office visit: 4/12/2019 with prescribing provider:  Win   Future Office Visit:      Requested Prescriptions   Pending Prescriptions Disp Refills     rizatriptan (MAXALT) 10 MG tablet [Pharmacy Med Name: RIZATRIPTAN 10MG TAB] 18 tablet 3     Sig:  TAKE 1 TABLET BY MOUTH AT ONSET OF HEADACHE FOR MIGRAINE. MAY REPEAT DOSE IN 2 HOURS, DO NOT TAKE MORE THAN 2 TABLETS IN 24 HOURS       Serotonin Agonists Failed - 11/12/2019  5:31 AM        Failed - Serotonin Agonist request needs review.     Please review patient's record. If patient has had 8 or more treatments in the past month, please forward to provider.          Passed - Blood pressure under 140/90 in past 12 months     BP Readings from Last 3 Encounters:   04/12/19 98/64   04/12/18 94/59   11/27/17 100/62                 Passed - Recent (12 mo) or future (30 days) visit within the authorizing provider's specialty     Patient has had an office visit with the authorizing provider or a provider within the authorizing providers department within the previous 12 mos or has a future within next 30 days. See \"Patient Info\" tab in inbasket, or \"Choose Columns\" in Meds & Orders section of the refill encounter.              Passed - Medication is active on med list        Passed - Patient is age 18 or older        Passed - No active pregnancy on record        Passed - No positive pregnancy test in past 12 months          "

## 2019-11-13 RX ORDER — RIZATRIPTAN BENZOATE 10 MG/1
TABLET ORAL
Qty: 18 TABLET | Refills: 3 | Status: SHIPPED | OUTPATIENT
Start: 2019-11-13 | End: 2020-01-14

## 2020-01-06 ENCOUNTER — TELEPHONE (OUTPATIENT)
Dept: FAMILY MEDICINE | Facility: CLINIC | Age: 40
End: 2020-01-06

## 2020-01-06 NOTE — TELEPHONE ENCOUNTER
Dr. Walden- see message below.  Last visit 4/12/19.  Only med on med list is Maxalt.  Not on daily preventative med.  Was on Topamax in past.  Do you want e-visit or telephone visit?  Please advise. Carolyn Franco RN

## 2020-01-06 NOTE — TELEPHONE ENCOUNTER
Patient is calling stating that her headaches are increasing in frequency. She'd like to be referred to a pain management clinic but since she hasn't been seen in a little while she wasn't sure if she needed to be seen first. Please advise and call patient at [phone].    Shania Morris,

## 2020-01-08 ENCOUNTER — VIRTUAL VISIT (OUTPATIENT)
Dept: FAMILY MEDICINE | Facility: CLINIC | Age: 40
End: 2020-01-08
Payer: COMMERCIAL

## 2020-01-08 DIAGNOSIS — G43.009 MIGRAINE WITHOUT AURA AND WITHOUT STATUS MIGRAINOSUS, NOT INTRACTABLE: Primary | ICD-10-CM

## 2020-01-08 PROCEDURE — 99441 ZZC PHYSICIAN TELEPHONE EVALUATION 5-10 MIN: CPT | Performed by: FAMILY MEDICINE

## 2020-01-08 NOTE — PROGRESS NOTES
"Marie Lal is a 39 year old female who is being evaluated via a billable telephone visit.      The patient has been notified of following:     \"This telephone visit will be conducted via a call between you and your physician/provider. We have found that certain health care needs can be provided without the need for a physical exam.  This service lets us provide the care you need with a short phone conversation.  If a prescription is necessary we can send it directly to your pharmacy.  If lab work is needed we can place an order for that and you can then stop by our lab to have the test done at a later time.    If during the course of the call the physician/provider feels a telephone visit is not appropriate, you will not be charged for this service.\"     Consent has been obtained for this service by 1 care team member: yes. See the scanned image in the medical record.    Marie Lal complains of    Chief Complaint   Patient presents with     RECHECK     Migraines, requesting referral to Pain Management       I have reviewed and updated the patient's Past Medical History, Social History, Family History and Medication List.    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  Migraine     Since your last clinic visit, how have your headaches changed?  Worsened    How often are you getting headaches or migraines? 10+ days a month     Are you able to do normal daily activities when you have a migraine? Yes    Are you taking rescue/relief medications? (Select all that apply) Maxalt    How helpful is your rescue/relief medication?  I get some relief    Are you taking any medications to prevent migraines? (Select all that apply)  No    In the past 4 weeks, " how often have you gone to urgent care or the emergency room because of your headaches?  0     Pt tried Topamax, and Imitrex, Maxalt (that is working)      Assessment/Plan:  (G43.009) Migraine without aura and without status migrainosus, not intractable  (primary encounter diagnosis)  Comment: not controlled, tried prophylaxis medicine with no improvement, will refer to   Plan: PAIN MANAGEMENT REFERRAL        \    I have reviewed the note as documented above.  This accurately captures the substance of my conversation with the patient.      Total time of call between patient and provider was 5:30 minutes     Melva Walden MD  [facility] [address]  [phone]      Answers for HPI/ROS submitted by the patient on 1/8/2020   Chronic problems general questions HPI Form  How many servings of fruits and vegetables do you eat daily?: 2-3  On average, how many sweetened beverages do you drink each day (Examples: soda, juice, sweet tea, etc.  Do NOT count diet or artificially sweetened beverages)?: 0  How many days per week do you miss taking your medication?: 0

## 2020-01-13 NOTE — PROGRESS NOTES
[facility]     Date of visit: 1/14/2020    Reason for consultation:    Marie Lal is a 39 year old female who is seen in consultation today at the request of her PCP,  Melva Walden for evaluation of her pain issues and recommendations for management, with specific emphasis on  Reason for Referral: Evaluation for comprehensive services- patients will be evaluated if appropriate for comprehensive   service including medication changes, procedures, pain psychology, and pain physical therapy.  While involved with comprehensive services, pain providers will work with referring provider/PCP to stabilize appropriate medication management, with long-term plan of transition of prescribing back to referring provider/PCP upon completion of comprehensive services.      Please complete the following questions:    Do you have any specific questions for the pain specialist? No    Are there any red flags that may impact the assessment or management of the patient? None      What is your diagnosis for the patient's pain?     Please see the [facility] [place] health questionnaire which the patient completed and reviewed with me in detail.    Review of [place] Prescription Monitoring Program (): No concern for abuse or misuse of controlled medications based on this report.     Pain medications are being prescribed by NA.     Subjective:    Chief Complaint:    Chief Complaint   Patient presents with     Pain Management     left and occasional right sided miagraines       Pain history:  Marie Lal is a 39 year old female who presents for initial evaluation of chief complaint of headaches.      She first started having problems with headaches a few years ago. Insidious onset, without acute precipitating event. She was diagnosed with migraine headaches by her primary care provider. She has had issues with rebound headaches with ibuprofen. She has tried Imitrex without benefit and  "side effect of dizziness. She tried topamax with unclear benefit, developed racing thoughts and numbness and tingling. She was started on Maxalt with some benefit, increased to 10mg with benefit. She also takes naproxen with Maxalt with benefit. This has been effective until the last couple of months, is taking more more often with less benefit. Her headaches return the next day. She walks on a treadmill at work on a daily basis. She has tried pilates and yoga. She has tried to reduce stress and eat better without benefit. She takes a Maxalt at onset but headache returns the following day. Occasional mild nausea with Maxalt. The headache is located on the left side of her head, located in her temple. Denies aura. Describes the pain as \"throbbing.\" Occasionally her headache will \"turn full blown\" and spread to both sides of her frontal head and, at times to right side of head. She occasionally has photophobia and phonophobia. She has between 10-14 headache days a month.     Pain description:  Location: left side of head  Quality: throbbing  Severity/Intensity: 0/10 at best, 8/10 at worst, 6/10 on average  Aggravating factors include: watching TV, bright light, noise  Relieving factors include: nothing yet    The patient otherwise denies bowel or bladder incontinence, parasthesias, weakness, saddle anesthesia, unintentional weight loss, or fever/chills/sweats.     Marie Lal has not been seen at a pain clinic in the past.      Pain Treatments:  (H--helped; HI--Helped initially; SWH--Somewhat helpful; NH--No help; W--worse; SE--side effects; ?--Unsure if helpful)   1. Medications:       Current pain medications:   Maxalt 10mg prn can repeat 2 hours later- SWH, less so than previous, has taken up to 3 tabs/day    Naproxen 220mg prn- SW, takes with Maxalt occasionally    Current calculated MME: 0    1. Previous Pain Relevant Medications:  NOTE: This medication information taken from patient's intake form, not " medical records.    Opiates: no   NSAIDS: ibuprofen- SE, rebound headaches, naproxen     Muscle Relaxants: no   Anti-migraine mediations: Imitrex- NH, SE, dizziness, Maxalt- SWH   Anti-depressants: no   Sleep aids: Melatonin- SWH   Anxiolytics: no   Neuropathics: Topamax- ?, SE, numbness and tingling, racing thoughts    Topicals: no   Other medications not covered above: Tylenol- NH    2. Physical Therapy: no  3. Pain Psychology: no  4. Surgery: no  5. Injections: no  6. Chiropractic: no  7. Acupuncture: no  8. TENS Unit: no    Past Medical History:  Past Medical History:   Diagnosis Date     Epilepsy seizure, generalized, convulsive (H)     resolved, last took med 2006     Menarche 15     Migraine     rare, takes motrin, 1 per month at most, contolled     Seizures (H)     None for 10 years - no medications     Shingles 30    right buttock     Varicella 9       Past Surgical History:  Past Surgical History:   Procedure Laterality Date      SECTION        SECTION  2013    Procedure:  SECTION;   SECTION ;  Surgeon: Joey Chirinos MD;  Location:  L+D       Medications:  Current Outpatient Medications   Medication Sig Dispense Refill     amitriptyline (ELAVIL) 10 MG tablet Take 3 tablets (30 mg) by mouth At Bedtime 90 tablet 0     eletriptan (RELPAX) 20 MG tablet Take 1 tablet (20 mg) by mouth at onset of headache for migraine May repeat in 2 hours. Max 4 tablets/24 hours. 18 tablet 0       Allergies:     Allergies   Allergen Reactions     Penicillins Hives     Childhood only.  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  HEAD/EYES:  headache, dizziness, or vision changes.    EARS/NOSE/THROAT: nosebleeds, hearing loss, sinus infection, earache, tinnitus.  IMMUNE:  allergies, cancer, immune deficiency, or infections.  SKIN:  itching, rash, hives  HEME/Lymphatic: anemia, easy bruising, easy bleeding.  RESPIRATORY: cough, wheezing, or shortness of breath  CARDIOVASCULAR/Circulation: extremity edema, syncope, hypertension, tachycardia, or angina.  GASTROINTESTINAL: abdominal pain, nausea/emesis, diarrhea, constipation,  hematochezia, or melena.  ENDOCRINE:  diabetes, steroid use,  thyroid disease or osteoporosis.  MUSCULOSKELETAL: joint pain, stiffness, neck pain, back pain, arthritis, or gout.  GENITOURINARY: frequency, urgency, dysuria, difficulty voiding, hematuria or incontinence.  NEUROLOGIC: weakness, numbness, paresthesias, seizure, tremor, stroke or memory loss.  PSYCHIATRIC: depression, anxiety, stress, suicidal thoughts or mood swings.     Objective:    Physical Exam:  Vitals:    01/14/20 1507   BP: 103/66   Pulse: 65   SpO2: 99%   Weight: 61.2 kg (135 lb)   Height: 1.549 m (5' 1\")     Exam:  Constitutional: Well developed, well nourished, appears stated age.  HEENT: Head atraumatic, normocephalic. Eyes without conjunctival injection or jaundice. Oropharynx clear. Neck supple. No obvious neck masses.  Skin: No rash, lesions, or petechiae of exposed skin.   Extremities: Peripheral pulses intact. No clubbing, cyanosis, or edema.  Psychiatric/mental status: Alert, without lethargy or stupor. Speech fluent. Appropriate affect. Mood normal. Able to follow commands without difficulty.     Neurologic exam:  CN:  Cranial nerves 2-12 are grossly intact  Motor:  5/5 UE and LE strength  Strength:       C4 (shoulder shrug)  symmetric 5/5       C5 (shoulder " abduction) symmetric 5/5       C6 (elbow flexion) symmetric 5/5       C7 (elbow extension) symmetric 5/5       C8 (finger abduction, thumb flexion) symmetric 5/5    Reflexes:     Biceps:     R:  2/4 L: 2/4   Brachioradialis   R:  2/4 L: 2/4   Patella:  R:  2/4 L: 2/4   Achilles:  R:  2/4 L: 2/4    Sensory:   Light touch: normal bilateral upper and lower extremities    No allodynia, dysesthesia, or hyperalgesia.    Assessment:  Marie Lal is a 39 year old female with a past medical history significant for shingles, epilepsy, and migraines who presents with complaints of headaches.     1. Headaches- etiology likely migraine headaches.   2. Mental Health - the patient's mental health concerns affect her experience of pain and contribute to her clinically significant distress.    1. Migraine without aura and without status migrainosus, not intractable        Plan:  The following recommendations were given to the patient. Diagnosis, treatment options, risks, benefits, and alternatives were discussed, and all questions were answered. The patient expressed understanding of the plan for management.     I am recommending a multidisciplinary treatment plan to help this patient better manage her pain.  This includes:      1.  Pain Physical Therapy:     We discussed that I may recommend a couple of sessions with Urvashi Luna PT to focus on posture and body movement/potential triggers. She will consider and let me know if interested. She will continue walking on the treadmill on a daily basis.    2.  Pain Psychologist to address relaxation, behavioral change, coping style, and other factors important to improvement.     Not at this time, she will let me know if interested.    3.  Medication Management:     1. We discussed that given severity, persistence, and debilitating nature of her migraines, I would recommend preventative medication. We discussed antidepressants- amitriptyline and Effexor, antihypertensives-  propanol and metoprolol, and anticonvulsants- topamax and gabapentin, as options. As sleep can sometimes be problematic for Marie, I suggested we start with amitriptyline and she is interested in this medication. She will start amitriptyline 10mg at bedtime and increase up to 30mg. If not effective, may consider propanolol next.    2. Maxalt seems less effective, Marie has to take a second dose more often and often has a lingering headache the following day. Imitrex was not helpful. Discontinue Maxalt. We will try Relpax next. Take one tablet at onset of headache, okay to repeat 2 hours later.    4.  Potential procedures: could consider botox in the future, we discussed we would need to try various preventative medications first.    5.  Could consider chiropractic care and acupuncture.    6.  Follow up with MEJIA Lan CNP in 4-5 weeks.     Review of Electronic Chart: Today I have also reviewed available medical information in the patient's medical record at [place] ([facility]), including relevant provider notes, laboratory work, and imaging.       I spent 60 minutes of time face to face with the patient.  Greater than 50% of this time was spent in patient counseling and/or coordination of care regarding principles of multidisciplinary care, medication management, and treatment options as discussed above.      MEJIA Lan CNP  [facility]

## 2020-01-14 ENCOUNTER — OFFICE VISIT (OUTPATIENT)
Dept: PALLIATIVE MEDICINE | Facility: CLINIC | Age: 40
End: 2020-01-14
Attending: FAMILY MEDICINE
Payer: COMMERCIAL

## 2020-01-14 VITALS
OXYGEN SATURATION: 99 % | BODY MASS INDEX: 25.49 KG/M2 | HEIGHT: 61 IN | WEIGHT: 135 LBS | SYSTOLIC BLOOD PRESSURE: 103 MMHG | HEART RATE: 65 BPM | DIASTOLIC BLOOD PRESSURE: 66 MMHG

## 2020-01-14 DIAGNOSIS — G43.009 MIGRAINE WITHOUT AURA AND WITHOUT STATUS MIGRAINOSUS, NOT INTRACTABLE: Primary | ICD-10-CM

## 2020-01-14 PROCEDURE — 99214 OFFICE O/P EST MOD 30 MIN: CPT | Performed by: NURSE PRACTITIONER

## 2020-01-14 PROCEDURE — 99207 ZZC DOWN CODE DUE TO SUBSEQUENT EXAM: CPT | Performed by: NURSE PRACTITIONER

## 2020-01-14 RX ORDER — AMITRIPTYLINE HYDROCHLORIDE 10 MG/1
30 TABLET ORAL AT BEDTIME
Qty: 90 TABLET | Refills: 0 | Status: SHIPPED | OUTPATIENT
Start: 2020-01-14 | End: 2020-03-12

## 2020-01-14 RX ORDER — ELETRIPTAN HYDROBROMIDE 20 MG/1
20 TABLET, FILM COATED ORAL
Qty: 18 TABLET | Refills: 0 | Status: SHIPPED | OUTPATIENT
Start: 2020-01-14 | End: 2020-10-01

## 2020-01-14 ASSESSMENT — MIFFLIN-ST. JEOR: SCORE: 1229.74

## 2020-01-14 NOTE — PATIENT INSTRUCTIONS
1.  Pain Physical Therapy:     Could consider a couple of sessions with Urvashi Luna PT to focus on posture and body movement/potential triggers, let me know if interested.    2.  Pain Psychologist to address relaxation, behavioral change, coping style, and other factors important to improvement.     Not at this time, let me know if interested.    3.  Medication Management:     1. Start amitriptyline at bedtime, take one tablet at bedtime for 5-7 days. Then increase to x2 tablets at bedtime for 5-7 days. Then increase to x3 tablets at bedtime.      2. Discontinue Maxalt and try Relpax. Take one tablet at onset of headache, okay to repeat 2 hours later.    4.  Potential procedures: could consider botox in the future.     5.  Could consider chiropractic care and acupuncture.    6.  Follow up with MEJIA Lan CNP in 4-5 weeks.     Foods to avoid/limit that may trigger migraines:    AVOID  -Artificial sweetners/aspartame  -Breads/crackers containing cheese  -Chicken liver  -Chocolate  -Lakesha and lima beans  -Fermented, pickled or marinated foods  -Herring  -Meat tenderizers  -MSG  -Nuts, including peanut butter  -Pizza  -Ripened cheeses (cheddar, emmentaler, stilton, brie, camembert)  -Sausage, bologna, pepperoni, salami, hot dogs  -Seasoned salt  -Snow peas  -Sourdough bread  -Soy sauce    LIMIT  -Alcoholic beverages  -Bananas  -Caffeinated beverages  -Citrus fruit  -Figs, raisins, papayas, avocados, red plums  -Sour cream    ----------------------------------------------------------------  Clinic Number:  [phone]     Call with any questions about your care and for scheduling assistance.     Calls are returned Monday through Friday between 8 AM and 4:30 PM. We usually get back to you within 2 business days depending on the issue/request.    If we are prescribing your medications:    For opioid medication refills, call the clinic or send a [employer] message 7 days in advance.  Please include:    Name of  requested medication    Name of the pharmacy.    For non-opioid medications, call your pharmacy directly to request a refill. Please allow 3-4 days to be processed.     Per [place] State Law:    All controlled substance prescriptions must be filled within 30 days of being written.      For those controlled substances allowing refills, pickup must occur within 30 days of last fill.      We believe regular attendance is key to your success in our program!      Any time you are unable to keep your appointment we ask that you call us at least 24 hours in advance to cancel.This will allow us to offer the appointment time to another patient.     Multiple missed appointments may lead to dismissal from the clinic.

## 2020-01-15 ENCOUNTER — MYC MEDICAL ADVICE (OUTPATIENT)
Dept: PALLIATIVE MEDICINE | Facility: CLINIC | Age: 40
End: 2020-01-15

## 2020-01-15 DIAGNOSIS — G43.009 MIGRAINE WITHOUT AURA AND WITHOUT STATUS MIGRAINOSUS, NOT INTRACTABLE: ICD-10-CM

## 2020-01-16 RX ORDER — RIZATRIPTAN BENZOATE 10 MG/1
TABLET ORAL
Qty: 18 TABLET | Refills: 3 | Status: SHIPPED | OUTPATIENT
Start: 2020-01-16 | End: 2020-06-15

## 2020-01-16 NOTE — TELEPHONE ENCOUNTER
Pt read her [employer] message on 01/16/2020 at 0942 am.    Linda Agrawal RN  Care Coordinator  [facility]

## 2020-01-16 NOTE — TELEPHONE ENCOUNTER
Cherri message from patient on 01/15/2020 at 0907:   Hi! I went to fill the Relpax and it s way too expensive for me. I wanted to know if I could take the Amitriptyline with the Rizatriptan? I did ask my insurance company about other headache meds and they are all a few hundred dollars a month so I ll have to stick to the rizatriptan for now.   ________________________________    Routing to provider to review and advise      Linda Agrawal RN  Care Coordinator  [facility]

## 2020-01-16 NOTE — TELEPHONE ENCOUNTER
My chart response:  Good Morning Cristine Salazar did review your [employer] message.  She was sorry to hear the Relpax was so expensive for you.  It is fine to take the Amitriptyline with the Rizatriptan.  She did send over a prescription for the Rizatriptan to your pharmacy.  Hope you have a wonderful day.    Linda Agrawal RN  Care Coordinator  [facility]

## 2020-01-16 NOTE — TELEPHONE ENCOUNTER
Oh shoot I am sorry to hear it. Yes that's fine. Hopefully amitriptyline will help reduce frequency and severity of headaches so that Maxalt is more effective.     Signed Prescriptions:                        Disp   Refills    rizatriptan (MAXALT) 10 MG tablet          18 tab*3        Sig: TAKE 1 TABLET BY MOUTH AT ONSET OF HEADACHE FOR           MIGRAINE. MAY REPEAT DOSE IN 2 HOURS, DO NOT TAKE           MORE THAN 2 TABLETS IN 24 HOURS  Authorizing Provider: MAGDY HUSSEIN APRN [facility]

## 2020-01-23 ENCOUNTER — MYC MEDICAL ADVICE (OUTPATIENT)
Dept: FAMILY MEDICINE | Facility: CLINIC | Age: 40
End: 2020-01-23

## 2020-01-23 NOTE — TELEPHONE ENCOUNTER
Chart updated    Adriana Martinez/[facility]---[facility]

## 2020-02-24 ENCOUNTER — HEALTH MAINTENANCE LETTER (OUTPATIENT)
Age: 40
End: 2020-02-24

## 2020-03-24 ENCOUNTER — VIRTUAL VISIT (OUTPATIENT)
Dept: FAMILY MEDICINE | Facility: OTHER | Age: 40
End: 2020-03-24

## 2020-03-24 NOTE — PROGRESS NOTES
"Date: 2020 07:06:26  Clinician: Anuja Smith  Clinician NPI: [id number]  Patient: Marie Lal  Patient : 1980  Patient Address:  [place] [address]  Patient Phone: [phone]  Visit Protocol: URI  Patient Summary:  Marie is a 39 year old ( : 1980 ) female who initiated a Visit for cold, sinus infection, or influenza. When asked the question \"Please sign me up to receive news, health information and promotions. \", Marie responded \"No\".    Marie states her symptoms started suddenly 3-6 days ago.   Her symptoms consist of enlarged lymph nodes, myalgia, a sore throat, nasal congestion, malaise, and chills. She is experiencing difficulty breathing due to nasal congestion but she is not short of breath.   Symptom details     Nasal secretions: The color of her mucus is green and yellow.    Sore throat: Marie reports having severe throat pain (7-9 on a 10 point pain scale), has exudate on her tonsils, and can swallow liquids. The lymph nodes in her neck are enlarged. A rash has not appeared on the skin since the sore throat started.      Marie denies having ear pain, rhinitis, facial pain or pressure, wheezing, cough, teeth pain, headache, and fever. She also denies double sickening (worsening symptoms after initial improvement) and having recent facial or sinus surgery in the past 60 days.   Precipitating events  Marie is not sure if she has been exposed to someone with strep throat. She has not recently been exposed to someone with influenza. Marie has not been in close contact with any high risk individuals.   Pertinent COVID-19 (Coronavirus) information  Marie has not traveled internationally or to the areas where COVID-19 (Coronavirus) is widespread, including cruise ship travel in the last 14 days before the start of her symptoms.   Marie has not had a close contact with a laboratory-confirmed COVID-19 patient within 14 days of symptom " onset. She also has not had a close contact with a suspected COVID-19 patient within 14 days of symptom onset.   Marie is not a healthcare worker and does not work in a healthcare facility.   Triage Point(s) temporarily suspended for COVID-19 (Coronavirus) screening  Marie reported the following symptoms which were previously protocol referral points. These protocol referral points have temporarily been removed for purposes of COVID-19 (Coronavirus) screening.   Meets at least 3/5 centor score criteria     Swollen lymph nodes    Exudate on tonsils    Absence of cough         Pertinent medical history  Marie has taken an antibiotic medication in the past month. Antibiotic details as reported by the patient (free text): Doxycycline Mono 100mg Cap - sinus infection   Marie does not get yeast infections when she takes antibiotics.   Marie does not need a return to work/school note.   Weight: 131 lbs   Marie does not smoke or use smokeless tobacco.   She denies pregnancy and denies breastfeeding. She is currently menstruating.   Weight: 131 lbs    MEDICATIONS: spironolactone-hydrochlorothiazide oral, rizatriptan oral, ALLERGIES: Penicillins, Penicillins  Clinician Response:  Dear Marie,   Based on your symptoms, I am concerned you may have strep throat.&nbsp; We are not currently swabbing patients for strep due to the potential risk of spreading coronavirus, so instead we are treating based on symptoms.&nbsp; The severity of your sore throat, combined with the exudate on your tonsils and swollen lymph nodes has me concerned enough that I am advising we go ahead and start you on an antibiotic.&nbsp; I am sending in azithromycin. If you do develop any fever or respiratory symptoms, please submit another visit.&nbsp; Feel better soon,       COVID-19 (Coronavirus) General Information  With the increase in the number of COVID-19 (Coronavirus) cases, we understand you may have some questions. Below is some  helpful information on COVID-19 (Coronavirus).  How can I protect myself and others from the COVID-19 (Coronavirus)?  Because there is currently no vaccine to prevent infection, the best way to protect yourself is to avoid being exposed to this virus. Put distance between yourself and other people if COVID-19 (Coronavirus) is spreading in your community. The virus is thought to spread mainly from person-to-person.     Between people who are in close contact with one another (within about 6 about) for a prolonged period (10 minutes or longer).    Through respiratory droplets produced when an infected person coughs or sneezes.     The CDC recommends the following additional steps to protect yourself and others:     Wash your hands often with soap and water for at least 20 seconds, especially after blowing your nose, coughing, or sneezing; going to the bathroom; and before eating or preparing food.  Use an alcohol-based hand  that contains at least 60 percent alcohol if soap and water are not available.        Avoid touching your eyes, nose and mouth with unwashed hands.    Avoid close contact with people who are sick.    Stay home when you are sick.    Cover your cough or sneeze with a tissue, then throw the tissue in the trash.    Clean and disinfect frequently touched objects and surfaces.     You can help stop COVID-19 (Coronavirus) by knowing the signs and symptoms:     Fever    Cough    Shortness of breath     Contact your healthcare provider if   Develop symptoms   AND   Have been in close contact with a person known to have COVID-19 (Coronavirus) or live in or have recently traveled from an area with ongoing spread of COVID-19 (Coronavirus). Call ahead before you go to a doctor's office or emergency room. Tell them about your recent travel and your symptoms.   For the most up to date information, visit the CDC's website.  Self-monitoring  Self-monitoring means people should monitor themselves for fever  by taking their temperatures twice a day and remain alert for a cough or difficulty breathing.  It is important to check your health two times each day for 14 days after a potential exposure to a person with COVID-19 (Coronavirus) or after travel from a location where COVID-19 (Coronavirus) is widespread. If you have been exposed to a person with COVID-19 (Coronavirus), it may take up to 14 days to know if you will get sick. Follow the steps below to check and record your health.     Take your temperature with a thermometer twice a day, once in the morning and once in the evening, and watch for a cough or difficulty breathing for 14 days.    Write down your temperature and any COVID-19 symptoms you may have: feeling feverish, coughing, or difficulty breathing.    Stay home from work or school.    Do not take public transportation, taxis, or ride-shares.    Avoid crowded places (such as shopping centers and movie theaters) and limit your activities in public.    Keep your distance from others (about 6 feet or 2 meters).    If you get sick with fever, cough, or trouble breathing, contact your healthcare provider and tell them about your recent travel and/or your symptoms.    If you need to seek medical care for other reasons, such as dialysis, call ahead to your doctor and tell them about your recent travel.     Steps to help prevent the spread of COVID-19 (Coronavirus) if you are sick  If you are sick with COVID-19 (Coronavirus) or suspect you are infected with the virus that causes COVID-19 (Coronavirus), follow the steps below to help prevent the disease from spreading&nbsp;to people in your home and community.     Stay home except to get medical care. Home isolation may be started in consultation with your healthcare clinician.    Separate yourself from other people and animals in your home.    Call ahead before visiting your doctor if you have a medical appointment.    Wear a facemask when you are around other  "people.    Cover your cough and sneezes.    Clean your hands often.    Avoid sharing personal household items.    Clean and disinfect frequently touched objects and surfaces everyday.    You will need to have someone drop off medications or household supplies (if needed) at your house without coming inside or in contact with you or others living in your house.    Monitor your symptoms and seek prompt medical care if your illness is worsening (e.g. Difficulty breathing).    Discontinue home isolation only in consultation with your healthcare provider.     For more detailed and up to date information on what to do if you are sick, visit this link: What to Do If You Are Sick With Coronavirus Disease 2019 (COVID-19).  Do I need to be tested for COVID-19 (Coronavirus)?     At this time, the limited number of available tests are controlled by the state and local health departments and are being reserved for more seriously ill patients, those with known exposure to confirmed patients, and those with recent travel (within 14 days) to countries with high rates of COVID-19 (Coronavirus).    Decisions on which patients receive testing will be based on the local spread of COVID-19 (Coronavirus) as well as the symptoms. Your healthcare provider will make the final decision on whether you should be tested.    In the meantime, if you have concerns that you may have been exposed, it is reasonable to practice \"social distancing.\"&nbsp; If you are ill with a cold or flu-like illness, please monitor your symptoms and reach out to your healthcare provider if your symptoms worsen.    For more up to date information, visit this link: COVID-19 (Coronavirus) Frequently Asked Questions and Answers.      Diagnosis: Streptococcal pharyngitis  Diagnosis ICD: J02.0  Prescription: azithromycin (Zithromax Z-Josef) 250 mg oral tablet 6 tablet, 5 days supply. Take two tablets by mouth day 1. days 2-5 take one tablet by mouth. Refills: 0, Refill as " needed: no, Allow substitutions: yes  Pharmacy: [id number] - [phone] - [address] [id number]

## 2020-04-24 DIAGNOSIS — G43.009 MIGRAINE WITHOUT AURA AND WITHOUT STATUS MIGRAINOSUS, NOT INTRACTABLE: ICD-10-CM

## 2020-04-24 RX ORDER — AMITRIPTYLINE HYDROCHLORIDE 10 MG/1
30 TABLET ORAL AT BEDTIME
Qty: 90 TABLET | Refills: 0 | Status: SHIPPED | OUTPATIENT
Start: 2020-04-24 | End: 2020-10-01

## 2020-04-24 NOTE — TELEPHONE ENCOUNTER
Received fax request from     [facility] [address], [place] - [address] [facility]  [address] [facility] [address]  Phone: [phone] Fax: [phone]    pharmacy requesting refill(s) for amitriptyline (ELAVIL) 10 MG tablet     Last refilled on 03/12/20    Pt last seen on 01/14/20    No future appointments scheduled at this time    Will facilitate refill.

## 2020-06-15 ENCOUNTER — TELEPHONE (OUTPATIENT)
Dept: PALLIATIVE MEDICINE | Facility: CLINIC | Age: 40
End: 2020-06-15

## 2020-06-15 DIAGNOSIS — G43.009 MIGRAINE WITHOUT AURA AND WITHOUT STATUS MIGRAINOSUS, NOT INTRACTABLE: ICD-10-CM

## 2020-06-15 RX ORDER — RIZATRIPTAN BENZOATE 10 MG/1
TABLET ORAL
Qty: 18 TABLET | Refills: 3 | Status: SHIPPED | OUTPATIENT
Start: 2020-06-15 | End: 2020-10-29

## 2020-06-15 NOTE — TELEPHONE ENCOUNTER
Received fax request from [facility] pharmacy requesting refill(s) for rizatriptan (MAXALT) 10 MG tablet     Last refilled on 5/12/2020    Pt last seen on 1/14/2020  Next appt scheduled for none    Will facilitate refill.

## 2020-06-15 NOTE — TELEPHONE ENCOUNTER
Signed Prescriptions:                        Disp   Refills    rizatriptan (MAXALT) 10 MG tablet          18 tab*3        Sig: TAKE 1 TABLET BY MOUTH AT ONSET OF HEADACHE FOR           MIGRAINE. MAY REPEAT DOSE IN 2 HOURS, DO NOT TAKE           MORE THAN 2 TABLETS IN 24 HOURS  Authorizing Provider: MAGDY HUSSEIN      Future refills to come from primary care provider unless she would like to work with the pain program.    MEJIA Lan [facility]

## 2020-06-15 NOTE — TELEPHONE ENCOUNTER
Called Rayshawn Love informing her the Maxalt was sent to her pharmacy.  Future refills can come from her PCP unless she would like to work with our pain program. If so, please call [phone] to set up an appointment with Cristine Nicole.    Linda Agrawal RN  Care Coordinator  [facility]

## 2020-09-02 ENCOUNTER — VIRTUAL VISIT (OUTPATIENT)
Dept: FAMILY MEDICINE | Facility: OTHER | Age: 40
End: 2020-09-02

## 2020-09-02 NOTE — PROGRESS NOTES
"Date: 2020 09:31:29  Clinician: Colten Reid  Clinician NPI: [id number]  Patient: Marie Lal  Patient : 1980  Patient Address:  [place] [address]  Patient Phone: [phone]  Visit Protocol: General skin conditions  Patient Summary:  Marie is a 39 year old ( : 1980 ) female who initiated a Visit for evaluation of an unspecified skin condition. When asked the question \"Please sign me up to receive news, health information and promotions. \", Marie responded \"No\".    Images of her skin condition were uploaded.  Her symptoms started 1-2 weeks ago and affect the right side of her body. The skin condition is located on her neck and arms. The skin condition is red in color.   The affected area has hives and dry/flaky skin. It feels itchy. The symptoms do not interfere with her sleep.   Symptom details   Redness: The redness has not rapidly increased in size.   The skin condition has changed since the symptoms started. Description of changes as reported by the patient (free text): It started as little Pimple looking bites and I scratched them and they are now itchy and looking like welts. I've tried hydrocortisone cream, polysporin, Benadryl and aquafor ointment and nothing gets rid of it.   Denied symptoms include burning, blisters, scabs, crusts, tender to touch, pain, warm to touch, pimples, drainage, numbness, and sores. Marie does not feel feverish. She does not have a rash in the shape of a bull's-eye.   Treatments or home remedies used to relieve the symptoms as reported by the patient (free text): Benadryl, polysporin, aquafor ointment, ibuprofen and itch cream nothing seems to take the itch completely away or make the welts better.   Precipitating events  She traveled and spent time in a wooded area just before her symptoms started. Location traveled as reported by the patient (free text): [place]   Marie did not come in contact with any " irritants prior to the onset of her symptoms and has not been in close contact with anyone that has similar symptoms. Marie is not sure if she was bitten or stung by an insect.   Pertinent medical history  Marie has not experienced this skin condition before.   Marie has had chickenpox and has had shingles in the past.   Marie has a history of seasonal allergies or hay fever.   Ongoing medical conditions were denied.   Marie does not need a return to work/school note.   Weight: 135 lbs   Marie does not smoke or use smokeless tobacco.   She denies pregnancy and denies breastfeeding. She is currently menstruating.   Weight: 135 lbs    MEDICATIONS: rizatriptan oral, spironolactone-hydrochlorothiazide oral, ALLERGIES: Penicillins, Penicillins  Clinician Response:  Dear Marie,   Based on the information provided, you have a rash without a clear cause. A rash can be caused by diseases, irritating substances, allergies, and your genetics.   Although some skin rashes have a distinct appearance, many rash symptoms do not point to a specific cause. As long as symptoms are not a sign of a serious condition, treatment focuses on controlling symptoms.  Self care  Steps you can take to be as comfortable as possible:     Avoid scratching the rash    Apply a cool, wet washcloth to your rash for 15 minutes several times a day    Take a lukewarm bath to soothe the skin (adding colloidal oatmeal can help even more)    Apply a moisturizing lotion immediately after bathing and frequently reapply throughout the day    Use mild soap and laundry detergent    Choose clothing and bedding made of a breathable material like cotton    Do not use antibiotic creams or ointments unless recommended by a provider     When to seek care  Please make an appointment to be seen in a clinic or urgent care if any of the following occur:     Symptoms do not improve after 7 days of treatment    New symptoms develop, or symptoms become worse     Symptoms are so severe that you are unable to sleep or do regular activities    You have areas of broken skin from scratching    You notice symptoms of a skin infection (spreading redness, pain that is not improving, fever, warmth)      Diagnosis: Rash and other nonspecific skin eruption  Diagnosis ICD: R21  Additional Clinician Notes:  Many rashes are hard to diagnose in pictures. These do look a bit like ringworm. if you haven't tried an over the counter anti fungal such as laminal or lotrimin, that would be reasonable. Usually we try that for 1-2 weeks. An alternative would be to be seen at your clinic or by a skin specialist or an UC where they can examine this rash more closely.&nbsp;

## 2020-09-16 ENCOUNTER — OFFICE VISIT (OUTPATIENT)
Dept: INTERNAL MEDICINE | Facility: CLINIC | Age: 40
End: 2020-09-16
Payer: COMMERCIAL

## 2020-09-16 VITALS
BODY MASS INDEX: 26.04 KG/M2 | HEIGHT: 61 IN | TEMPERATURE: 98.7 F | HEART RATE: 97 BPM | RESPIRATION RATE: 16 BRPM | OXYGEN SATURATION: 100 % | DIASTOLIC BLOOD PRESSURE: 62 MMHG | SYSTOLIC BLOOD PRESSURE: 90 MMHG | WEIGHT: 137.9 LBS

## 2020-09-16 DIAGNOSIS — L20.9 ATOPIC DERMATITIS, UNSPECIFIED TYPE: Primary | ICD-10-CM

## 2020-09-16 PROCEDURE — 99213 OFFICE O/P EST LOW 20 MIN: CPT | Performed by: NURSE PRACTITIONER

## 2020-09-16 ASSESSMENT — MIFFLIN-ST. JEOR: SCORE: 1242.89

## 2020-09-16 NOTE — PROGRESS NOTES
"Subjective     Marie Lal is a 39 year old female who presents to clinic today for the following health issues:    HPI       Concern - rash  Onset: 4 weeks  Description: itchy red flat pink rash started occipital then antecubital and hips and thighs  Intensity: mild  Progression of Symptoms:  worsening  Accompanying Signs & Symptoms: none  Previous history of similar problem: none  Precipitating factors:        Worsened by: scratching  Alleviating factors:        Improved by: none  Therapies tried and outcome: benedryl, HC, neosporin, antifungal      Review of Systems   CONSTITUTIONAL: NEGATIVE for fever, chills, change in weight  ENT/MOUTH: NEGATIVE for ear, mouth and throat problems  RESP: NEGATIVE for significant cough or SOB  CV: NEGATIVE for chest pain, palpitations or peripheral edema  PSYCHIATRIC: NEGATIVE for changes in mood or affect      Objective    BP 90/62 (BP Location: Right arm, Patient Position: Sitting, Cuff Size: Adult Regular)   Pulse 97   Temp 98.7  F (37.1  C) (Oral)   Resp 16   Ht 1.549 m (5' 1\")   Wt 62.6 kg (137 lb 14.4 oz)   LMP 08/13/2020 (Approximate)   SpO2 100%   BMI 26.06 kg/m    Body mass index is 26.06 kg/m .  Physical Exam   GENERAL: healthy, alert and no distress  SKIN: ocipittal, antecubital patches of confluent pink macular fine scale lesions with self excoriations            Assessment & Plan     Atopic dermatitis, unspecified type  OTC zyrtec, HC cream, gentle soaps, no fragranes, covered from sun  F/u derm if not improving       BMI:   Estimated body mass index is 26.06 kg/m  as calculated from the following:    Height as of this encounter: 1.549 m (5' 1\").    Weight as of this encounter: 62.6 kg (137 lb 14.4 oz).               Shaunna Pettit NP  [facility]    "

## 2020-09-16 NOTE — NURSING NOTE
"rash on arms, neck and buttocks.  Vital signs:  Temp: 98.7  F (37.1  C) Temp src: Oral BP: 90/62 Pulse: 97   Resp: 16 SpO2: 100 %     Height: 154.9 cm (5' 1\") Weight: 62.6 kg (137 lb 14.4 oz)  Estimated body mass index is 26.06 kg/m  as calculated from the following:    Height as of this encounter: 1.549 m (5' 1\").    Weight as of this encounter: 62.6 kg (137 lb 14.4 oz).          "

## 2020-10-01 ENCOUNTER — OFFICE VISIT (OUTPATIENT)
Dept: FAMILY MEDICINE | Facility: CLINIC | Age: 40
End: 2020-10-01
Payer: COMMERCIAL

## 2020-10-01 VITALS
WEIGHT: 137 LBS | HEART RATE: 78 BPM | RESPIRATION RATE: 18 BRPM | BODY MASS INDEX: 25.89 KG/M2 | DIASTOLIC BLOOD PRESSURE: 60 MMHG | OXYGEN SATURATION: 100 % | SYSTOLIC BLOOD PRESSURE: 93 MMHG | TEMPERATURE: 97.8 F

## 2020-10-01 DIAGNOSIS — R21 RASH: ICD-10-CM

## 2020-10-01 DIAGNOSIS — R82.90 NONSPECIFIC FINDING ON EXAMINATION OF URINE: ICD-10-CM

## 2020-10-01 DIAGNOSIS — N30.00 ACUTE CYSTITIS WITHOUT HEMATURIA: Primary | ICD-10-CM

## 2020-10-01 LAB
ALBUMIN UR-MCNC: NEGATIVE MG/DL
APPEARANCE UR: ABNORMAL
BACTERIA #/AREA URNS HPF: ABNORMAL /HPF
BILIRUB UR QL STRIP: NEGATIVE
COLOR UR AUTO: YELLOW
GLUCOSE UR STRIP-MCNC: NEGATIVE MG/DL
HGB UR QL STRIP: NEGATIVE
KETONES UR STRIP-MCNC: NEGATIVE MG/DL
LEUKOCYTE ESTERASE UR QL STRIP: ABNORMAL
NITRATE UR QL: POSITIVE
NON-SQ EPI CELLS #/AREA URNS LPF: ABNORMAL /LPF
PH UR STRIP: 8 PH (ref 5–7)
RBC #/AREA URNS AUTO: ABNORMAL /HPF
SOURCE: ABNORMAL
SP GR UR STRIP: 1.02 (ref 1–1.03)
UROBILINOGEN UR STRIP-ACNC: 0.2 EU/DL (ref 0.2–1)
WBC #/AREA URNS AUTO: ABNORMAL /HPF
WBC CLUMPS #/AREA URNS HPF: PRESENT /HPF

## 2020-10-01 PROCEDURE — 87086 URINE CULTURE/COLONY COUNT: CPT | Performed by: PHYSICIAN ASSISTANT

## 2020-10-01 PROCEDURE — 87088 URINE BACTERIA CULTURE: CPT | Performed by: PHYSICIAN ASSISTANT

## 2020-10-01 PROCEDURE — 81001 URINALYSIS AUTO W/SCOPE: CPT | Performed by: PHYSICIAN ASSISTANT

## 2020-10-01 PROCEDURE — 87186 SC STD MICRODIL/AGAR DIL: CPT | Performed by: PHYSICIAN ASSISTANT

## 2020-10-01 PROCEDURE — 99214 OFFICE O/P EST MOD 30 MIN: CPT | Performed by: PHYSICIAN ASSISTANT

## 2020-10-01 RX ORDER — SPIRONOLACTONE 50 MG/1
TABLET, FILM COATED ORAL
COMMUNITY
Start: 2020-08-20

## 2020-10-01 RX ORDER — NITROFURANTOIN 25; 75 MG/1; MG/1
100 CAPSULE ORAL 2 TIMES DAILY
Qty: 10 CAPSULE | Refills: 0 | Status: SHIPPED | OUTPATIENT
Start: 2020-10-01 | End: 2020-10-06

## 2020-10-01 RX ORDER — PREDNISONE 20 MG/1
TABLET ORAL
Qty: 14 TABLET | Refills: 0 | Status: SHIPPED | OUTPATIENT
Start: 2020-10-01 | End: 2020-10-13

## 2020-10-01 ASSESSMENT — PAIN SCALES - GENERAL: PAINLEVEL: SEVERE PAIN (6)

## 2020-10-01 NOTE — PROGRESS NOTES
Subjective     Marie Lal is a 40 year old female who presents to clinic today for the following health issues:    UTI    History of Present Illness       She eats 0-1 servings of fruits and vegetables daily.She consumes 1 sweetened beverage(s) daily.She exercises with enough effort to increase her heart rate 9 or less minutes per day.  She exercises with enough effort to increase her heart rate 3 or less days per week.   She is taking medications regularly.           Rash  Onset/Duration: middle aug   Description  Location: both arms, inside thighs, neck   Character: burning, red  Itching: severe- initially but now improved  Intensity:  mild  Progression of Symptoms:  improving  Accompanying signs and symptoms:   Fever: no  Body aches or joint pain: no  Sore throat symptoms: no  Recent cold symptoms: no  History:           Previous episodes of similar rash: None  New exposures:  None  Recent travel: no  Exposure to similar rash: no    Initially she thought the rash was some knat bites.  She denies any new topical substances or new foods.  She has been consistently taking an oral antihistamine for the past few weeks.     She has tried many different topical treatments (lotions, topical antibiotics and topical steroids).    Genitourinary - Female  Onset/Duration: a week ago   Description:   Painful urination (Dysuria): YES- little bit            Frequency: YES  Blood in urine (Hematuria): YES- little bit   Delay in urine (Hesitency): YES  Intensity: 6/10  Progression of Symptoms:  constant  Accompanying Signs & Symptoms:  Fever/chills: no  Flank pain: no  Nausea and vomiting: no  Vaginal symptoms: odor and itching  Abdominal/Pelvic Pain: no  History:   History of frequent UTI s: no  History of kidney stones: no  Sexually Active: YES  Possibility of pregnancy: Don't Know    Review of Systems   GENERAL:  No fevers  SKIN: As noted in HPI          Objective    BP 93/60 (BP Location: Right arm, Patient Position:  Sitting, Cuff Size: Adult Large)   Pulse 78   Temp 97.8  F (36.6  C) (Oral)   Resp 18   Wt 62.1 kg (137 lb)   LMP 09/30/2020   SpO2 100%   BMI 25.89 kg/m    Body mass index is 25.89 kg/m .  Physical Exam   GENERAL: No acute distress  HEENT: Normocephalic  SKIN: Erythematous raised confluent plaques over the volar forearms.  NEURO: Alert and non-focal      Results for orders placed or performed in visit on 10/01/20 (from the past 24 hour(s))   *UA reflex to Microscopic and Culture ([facility] (except [place] and [place])    Specimen: Midstream Urine   Result Value Ref Range    Color Urine Yellow     Appearance Urine Cloudy     Glucose Urine Negative NEG^Negative mg/dL    Bilirubin Urine Negative NEG^Negative    Ketones Urine Negative NEG^Negative mg/dL    Specific Gravity Urine 1.020 1.003 - 1.035    Blood Urine Negative NEG^Negative    pH Urine 8.0 (H) 5.0 - 7.0 pH    Protein Albumin Urine Negative NEG^Negative mg/dL    Urobilinogen Urine 0.2 0.2 - 1.0 EU/dL    Nitrite Urine Positive (A) NEG^Negative    Leukocyte Esterase Urine Trace (A) NEG^Negative    Source Midstream Urine    Urine Microscopic   Result Value Ref Range    WBC Urine 10-25 (A) OTO5^0 - 5 /HPF    RBC Urine O - 2 OTO2^O - 2 /HPF    WBC Clumps Present (A) NEG^Negative /HPF    Squamous Epithelial /LPF Urine Few FEW^Few /LPF    Bacteria Urine Many (A) NEG^Negative /HPF           Assessment & Plan     Acute cystitis without hematuria  Urinalysis consistent with infection. Treated with Macrobid.  - *UA reflex to Microscopic and Culture ([facility] (except [place] and [place])  - Urine Microscopic  - Urine Culture Aerobic Bacterial  - nitroFURantoin macrocrystal-monohydrate (MACROBID) 100 MG capsule; Take 1 capsule (100 mg) by mouth 2 times daily for 5 days    Nonspecific finding on examination of urine  As noted above.  - Urine Culture Aerobic Bacterial    Rash  Rash is consistent with an allergic reaction  however it is not improving with antihistamines. Trial of oral prednisone and referral to dermatology.  - predniSONE (DELTASONE) 20 MG tablet; Take 2 tablets (40 mg) by mouth daily for 4 days, THEN 1 tablet (20 mg) daily for 4 days, THEN 0.5 tablets (10 mg) daily for 4 days.  - DERMATOLOGY ADULT REFERRAL; Future      Return in about 1 month (around 11/1/2020) for Annual Physical Exam/Pap, In Person.    Lisette Perea PA-C  [facility]

## 2020-10-03 LAB
BACTERIA SPEC CULT: ABNORMAL
SPECIMEN SOURCE: ABNORMAL

## 2020-10-26 ENCOUNTER — OFFICE VISIT (OUTPATIENT)
Dept: DERMATOLOGY | Facility: CLINIC | Age: 40
End: 2020-10-26
Payer: COMMERCIAL

## 2020-10-26 VITALS — HEART RATE: 79 BPM | OXYGEN SATURATION: 100 % | DIASTOLIC BLOOD PRESSURE: 65 MMHG | SYSTOLIC BLOOD PRESSURE: 101 MMHG

## 2020-10-26 DIAGNOSIS — L29.9 LOCALIZED PRURITUS: Primary | ICD-10-CM

## 2020-10-26 DIAGNOSIS — R21 RASH AND OTHER NONSPECIFIC SKIN ERUPTION: ICD-10-CM

## 2020-10-26 PROCEDURE — 99214 OFFICE O/P EST MOD 30 MIN: CPT | Mod: 25 | Performed by: PHYSICIAN ASSISTANT

## 2020-10-26 PROCEDURE — 11102 TANGNTL BX SKIN SINGLE LES: CPT | Performed by: PHYSICIAN ASSISTANT

## 2020-10-26 PROCEDURE — 88305 TISSUE EXAM BY PATHOLOGIST: CPT | Performed by: DERMATOLOGY

## 2020-10-26 PROCEDURE — 88312 SPECIAL STAINS GROUP 1: CPT | Performed by: DERMATOLOGY

## 2020-10-26 RX ORDER — FLUOCINONIDE 0.5 MG/G
CREAM TOPICAL 2 TIMES DAILY
Qty: 60 G | Refills: 0 | Status: SHIPPED | OUTPATIENT
Start: 2020-10-26

## 2020-10-26 RX ORDER — CETIRIZINE HYDROCHLORIDE 10 MG/1
TABLET ORAL
Qty: 60 TABLET | Refills: 1 | Status: SHIPPED | OUTPATIENT
Start: 2020-10-26 | End: 2021-03-17

## 2020-10-26 NOTE — LETTER
10/26/2020         RE: Marie Lal  [address] [place] [id number]        Dear Colleague,    Thank you for referring your patient, Marie Lal, to the [facility]. Please see a copy of my visit note below.    HPI:  I was asked to see pt by Dr. Walden. Marie Lal is a 40 year old female patient here today for rash on UE and posterior neck .  Patient states this has been present for 8 weeks.  Patient reports the following symptoms: itch and rash .  Patient reports the following previous treatments: otc with minimal change. aquaphor with improvement of itch.  Patient reports the following modifying factors: none.  Associated symptoms: none.  Patient has no other skin complaints today.  Remainder of the HPI, Meds, PMH, Allergies, FH, and SH was reviewed in chart.      Past Medical History:   Diagnosis Date     Epilepsy seizure, generalized, convulsive (H)     resolved, last took med 2006     Menarche 15     Migraine     rare, takes motrin, 1 per month at most, contolled     Seizures (H)     None for 10 years - no medications     Shingles 30    right buttock     Varicella 9       Past Surgical History:   Procedure Laterality Date      SECTION        SECTION  2013    Procedure:  SECTION;   SECTION ;  Surgeon: Joey Chirinos MD;  Location:  L+D        Family History   Problem Relation Age of Onset     Hypertension Mother      Cancer Maternal Grandfather         Bone       Social History     Socioeconomic History     Marital status:      Spouse name: Not on file     Number of children: 1     Years of education: Not on file     Highest education level: Not on file   Occupational History     Occupation: Associate small      Employer: [employer]   Social Needs     Financial resource strain: Not on file     Food insecurity     Worry: Not on file     Inability: Not on file      Transportation needs     Medical: Not on file     Non-medical: Not on file   Tobacco Use     Smoking status: Former Smoker     Years: 1.00     Quit date: 2013     Years since quittin.8     Smokeless tobacco: Never Used     Tobacco comment: quit with diagnosis of pregnancy   Substance and Sexual Activity     Alcohol use: Yes     Alcohol/week: 0.0 standard drinks     Comment: rarely     Drug use: No     Sexual activity: Yes     Partners: Male   Lifestyle     Physical activity     Days per week: Not on file     Minutes per session: Not on file     Stress: Not on file   Relationships     Social connections     Talks on phone: Not on file     Gets together: Not on file     Attends Oriental orthodox service: Not on file     Active member of club or organization: Not on file     Attends meetings of clubs or organizations: Not on file     Relationship status: Not on file     Intimate partner violence     Fear of current or ex partner: Not on file     Emotionally abused: Not on file     Physically abused: Not on file     Forced sexual activity: Not on file   Other Topics Concern     Parent/sibling w/ CABG, MI or angioplasty before 65F 55M? No      Service Not Asked     Blood Transfusions No     Caffeine Concern Not Asked     Occupational Exposure Not Asked     Hobby Hazards Not Asked     Sleep Concern Not Asked     Stress Concern Not Asked     Weight Concern Not Asked     Special Diet Not Asked     Back Care Not Asked     Exercise Not Asked     Bike Helmet Not Asked     Seat Belt Not Asked     Self-Exams Not Asked   Social History Narrative    Marie lives with her family        Outpatient Encounter Medications as of 10/26/2020   Medication Sig Dispense Refill     rizatriptan (MAXALT) 10 MG tablet TAKE 1 TABLET BY MOUTH AT ONSET OF HEADACHE FOR MIGRAINE. MAY REPEAT DOSE IN 2 HOURS, DO NOT TAKE MORE THAN 2 TABLETS IN 24 HOURS 18 tablet 3     spironolactone (ALDACTONE) 50 MG tablet TAKE 1 TABLET BY MOUTH EACH NIGHT  FOR 1 WEEK THEN INCREASE TO 2 TABLETS NIGHTLY THEREAFTER       No facility-administered encounter medications on file as of 10/26/2020.        Review Of Systems:  Skin: rash  Eyes: negative  Ears/Nose/Throat: negative  Respiratory: No shortness of breath, dyspnea on exertion, cough, or hemoptysis  Cardiovascular: negative  Gastrointestinal: negative  Genitourinary: negative  Musculoskeletal: negative  Neurologic: negative  Psychiatric: negative  Hematologic/Lymphatic/Immunologic: negative  Endocrine: negative      Objective:     /65   Pulse 79   LMP 09/30/2020   SpO2 100%   Eyes: Conjunctivae/lids: Normal   ENT: Lips:  Normal  MSK: Normal  Cardiovascular: Peripheral edema none  Pulm: Breathing Normal  Neuro/Psych: Orientation: A/O x 3 Normal; Mood/Affect: Normal, NAD, WDWN  Pt accompanied by: self  Following areas examined: Face ( patient is wearing face mask), neck, UE  Saldana skin type:ii   Findings:  Picture on phone show annular targetioid lesions on UE.   Today Red smooth papules and patches on UE  Assessment and Plan:  1) contact dermatitis vs erythema mulitforme vs urticaria and localized pruritis  Pt does not have a history of cold sores/herpes  Take one zyrtec by mouth BID  Apply a thin layer of lidex to affected area on UE and posterior neck 2x a day for 2 weeks. Tapering with improvement. Do not use on face or body folds.  Mix lotion with lidex and apply to antecubital fossa to cut the strength. Spot treat only to antecubital fossae.   Discussed side effects of topical steroids including but not limited to atrophy (skin thinning), striae (stretch marks) telangiectasias, steroid acne, and others. Do not apply to normal skin. Do not apply to discolored skin that does not have rash present. Educated patient on post inflammatory hyperpigmentation.   Moisturize 1-2x a day    Proper skin care from [facility]:    -Eliminate harsh soaps as they strip the natural oils from the skin, often  resulting in dry itchy skin ( i.e. Dial, Zest, Alicia Spring)  -Use mild soaps such as Cetaphil or Dove Sensitive Skin in the shower. You do not need to use soap on arms, legs, and trunk every time you shower unless visibly soiled.   -Avoid hot or cold showers.  -After showering, lightly dry off and apply moisturizing within 2-3 minutes. This will help trap moisture in the skin.   -Aggressive use of a moisturizer at least 1-2 times a day to the entire body (including -Vanicream, Cetaphil, Aquaphor or Cerave) and moisturize hands after every washing.  -We recommend using moisturizers that come in a tub that needs to be scooped out, not a pump. This has more of an oil base. It will hold moisture in your skin much better than a water base moisturizer. The above recommended are non-pore clogging.             Follow up in 2 weeks        Again, thank you for allowing me to participate in the care of your patient.        Sincerely,        Emilie Romero PA-C

## 2020-10-26 NOTE — PATIENT INSTRUCTIONS
Proper skin care from [facility]:    -Eliminate harsh soaps as they strip the natural oils from the skin, often resulting in dry itchy skin ( i.e. Dial, Zest, Alicia Spring)  -Use mild soaps such as Cetaphil or Dove Sensitive Skin in the shower. You do not need to use soap on arms, legs, and trunk every time you shower unless visibly soiled.   -Avoid hot or cold showers.  -After showering, lightly dry off and apply moisturizing within 2-3 minutes. This will help trap moisture in the skin.   -Aggressive use of a moisturizer at least 1-2 times a day to the entire body (including -Vanicream, Cetaphil, Aquaphor or Cerave) and moisturize hands after every washing.  -We recommend using moisturizers that come in a tub that needs to be scooped out, not a pump. This has more of an oil base. It will hold moisture in your skin much better than a water base moisturizer. The above recommended are non-pore clogging.      Wear a sunscreen with at least SPF 30 on your face, ears, neck and V of the chest daily. Wear sunscreen on other areas of the body if those areas are exposed to the sun throughout the day. Sunscreens can contain physical and/or chemical blockers. Physical blockers are less likely to clog pores, these include zinc oxide and titanium dioxide. Reapply every two hour and after swimming. Sunscreen examples include Neutrogena, CeraVe, Blue Lizard, Elta MD and many others.    UV radiation  UVA radiation remains constant throughout the day and throughout the year. It is a longer wavelength than UVB and therefore penetrates deeper into the skin leading to immediate and delayed tanning, photoaging, and skin cancer. 70-80% of UVA and UVB radiation occurs between the hours of 10am-2pm.  UVB radiation  UVB radiation causes the most harmful effects and is more significant during the summer months. However, snow and ice can reflect UVB radiation leading to skin damage during the winter months as well. UVB radiation is  responsible for tanning, burning, inflammation, delayed erythema (pinkness), pigmentation (brown spots), and skin cancer.     I recommend self monthly full body exams and yearly full body exams with a dermatology provider. If you develop a new or changing lesion please follow up for examination. Most skin cancers are pink and scaly or pink and pearly. However, we do see blue/brown/black skin cancers.  Consider the ABCDEs of melanoma when giving yourself your monthly full body exam ( don't forget the groin, buttocks, feet, toes, etc). A-asymmetry, B-borders, C-color, D-diameter, E-elevation or evolving. If you see any of these changes please follow up in clinic. If you cannot see your back I recommend purchasing a hand held mirror to use with a larger wall mirror.          Take one zyrtec by mouth 2x a day  Apply a thin layer of lidex to affected area 2x a day for 2 weeks. Tapering with improvement. Do not use on face or body folds.  Discussed side effects of topical steroids including but not limited to atrophy (skin thinning), striae (stretch marks) telangiectasias, steroid acne, and others. Do not apply to normal skin. Do not apply to discolored skin that does not have rash present. Educated patient on post inflammatory hyperpigmentation.   Moisturize 1-2x a day  Proper skin care from [facility]:    -Eliminate harsh soaps as they strip the natural oils from the skin, often resulting in dry itchy skin ( i.e. Dial, Zest, [place] Spring)  -Use mild soaps such as Cetaphil or Dove Sensitive Skin in the shower. You do not need to use soap on arms, legs, and trunk every time you shower unless visibly soiled.   -Avoid hot or cold showers.  -After showering, lightly dry off and apply moisturizing within 2-3 minutes. This will help trap moisture in the skin.   -Aggressive use of a moisturizer at least 1-2 times a day to the entire body (including -Vanicream, Cetaphil, Aquaphor or Cerave) and moisturize hands after  every washing.  -We recommend using moisturizers that come in a tub that needs to be scooped out, not a pump. This has more of an oil base. It will hold moisture in your skin much better than a water base moisturizer. The above recommended are non-pore clogging.                    Wound Care Instructions     FOR SUPERFICIAL WOUNDS     [facility] [phone]    [facility] [phone]          AFTER 24 HOURS YOU SHOULD REMOVE THE BANDAGE AND BEGIN DAILY DRESSING CHANGES AS FOLLOWS:     1) Remove Dressing.     2) Clean and dry the area with tap water using a Q-tip or sterile gauze pad.     3) Apply Vaseline, Aquaphor, Polysporin ointment or Bacitracin ointment over entire wound.  Do NOT use Neosporin ointment.     4) Cover the wound with a band-aid, or a sterile non-stick gauze pad and micropore paper tape      REPEAT THESE INSTRUCTIONS AT LEAST ONCE A DAY UNTIL THE WOUND HAS COMPLETELY HEALED.    It is an old wives tale that a wound heals better when it is exposed to air and allowed to dry out. The wound will heal faster with a better cosmetic result if it is kept moist with ointment and covered with a bandage.    **Do not let the wound dry out.**      Supplies Needed:      *Cotton tipped applicators (Q-tips)    *Polysporin Ointment or Bacitracin Ointment (NOT NEOSPORIN)    *Band-aids or non-stick gauze pads and micropore paper tape.      PATIENT INFORMATION:    During the healing process you will notice a number of changes. All wounds develop a small halo of redness surrounding the wound.  This means healing is occurring. Severe itching with extensive redness usually indicates sensitivity to the ointment or bandage tape used to dress the wound.  You should call our office if this develops.      Swelling  and/or discoloration around your surgical site is common, particularly when performed around the eye.    All wounds normally drain.  The larger the wound the more drainage there will  be.  After 7-10 days, you will notice the wound beginning to shrink and new skin will begin to grow.  The wound is healed when you can see skin has formed over the entire area.  A healed wound has a healthy, shiny look to the surface and is red to dark pink in color to normalize.  Wounds may take approximately 4-6 weeks to heal.  Larger wounds may take 6-8 weeks.  After the wound is healed you may discontinue dressing changes.    You may experience a sensation of tightness as your wound heals. This is normal and will gradually subside.    Your healed wound may be sensitive to temperature changes. This sensitivity improves with time, but if you re having a lot of discomfort, try to avoid temperature extremes.    Patients frequently experience itching after their wound appears to have healed because of the continue healing under the skin.  Plain Vaseline will help relieve the itching.        POSSIBLE COMPLICATIONS    BLEEDIN. Leave the bandage in place.  2. Use tightly rolled up gauze or a cloth to apply direct pressure over the bandage for 30  minutes.  3. Reapply pressure for an additional 30 minutes if necessary  4. Use additional gauze and tape to maintain pressure once the bleeding has stopped.

## 2020-10-26 NOTE — PROGRESS NOTES
HPI:  I was asked to see pt by Dr. Walden. Marie Lal is a 40 year old female patient here today for rash on UE and posterior neck .  Patient states this has been present for 8 weeks.  Patient reports the following symptoms: itch and rash .  Patient reports the following previous treatments: otc with minimal change. aquaphor with improvement of itch.  Patient reports the following modifying factors: none.  Associated symptoms: none.  Patient has no other skin complaints today.  Remainder of the HPI, Meds, PMH, Allergies, FH, and SH was reviewed in chart.      Past Medical History:   Diagnosis Date     Epilepsy seizure, generalized, convulsive (H)     resolved, last took med 2006     Menarche 15     Migraine     rare, takes motrin, 1 per month at most, contolled     Seizures (H)     None for 10 years - no medications     Shingles 30    right buttock     Varicella 9       Past Surgical History:   Procedure Laterality Date      SECTION        SECTION  2013    Procedure:  SECTION;   SECTION ;  Surgeon: Joey Chirinos MD;  Location:  L+D        Family History   Problem Relation Age of Onset     Hypertension Mother      Cancer Maternal Grandfather         Bone       Social History     Socioeconomic History     Marital status:      Spouse name: Not on file     Number of children: 1     Years of education: Not on file     Highest education level: Not on file   Occupational History     Occupation: Associate small      Employer: [employer]   Social Needs     Financial resource strain: Not on file     Food insecurity     Worry: Not on file     Inability: Not on file     Transportation needs     Medical: Not on file     Non-medical: Not on file   Tobacco Use     Smoking status: Former Smoker     Years: 1.00     Quit date: 2013     Years since quittin.8     Smokeless tobacco: Never Used     Tobacco comment: quit with diagnosis of  pregnancy   Substance and Sexual Activity     Alcohol use: Yes     Alcohol/week: 0.0 standard drinks     Comment: rarely     Drug use: No     Sexual activity: Yes     Partners: Male   Lifestyle     Physical activity     Days per week: Not on file     Minutes per session: Not on file     Stress: Not on file   Relationships     Social connections     Talks on phone: Not on file     Gets together: Not on file     Attends Baptist service: Not on file     Active member of club or organization: Not on file     Attends meetings of clubs or organizations: Not on file     Relationship status: Not on file     Intimate partner violence     Fear of current or ex partner: Not on file     Emotionally abused: Not on file     Physically abused: Not on file     Forced sexual activity: Not on file   Other Topics Concern     Parent/sibling w/ CABG, MI or angioplasty before 65F 55M? No      Service Not Asked     Blood Transfusions No     Caffeine Concern Not Asked     Occupational Exposure Not Asked     Hobby Hazards Not Asked     Sleep Concern Not Asked     Stress Concern Not Asked     Weight Concern Not Asked     Special Diet Not Asked     Back Care Not Asked     Exercise Not Asked     Bike Helmet Not Asked     Seat Belt Not Asked     Self-Exams Not Asked   Social History Narrative    Marie lives with her family        Outpatient Encounter Medications as of 10/26/2020   Medication Sig Dispense Refill     rizatriptan (MAXALT) 10 MG tablet TAKE 1 TABLET BY MOUTH AT ONSET OF HEADACHE FOR MIGRAINE. MAY REPEAT DOSE IN 2 HOURS, DO NOT TAKE MORE THAN 2 TABLETS IN 24 HOURS 18 tablet 3     spironolactone (ALDACTONE) 50 MG tablet TAKE 1 TABLET BY MOUTH EACH NIGHT FOR 1 WEEK THEN INCREASE TO 2 TABLETS NIGHTLY THEREAFTER       No facility-administered encounter medications on file as of 10/26/2020.        Review Of Systems:  Skin: rash  Eyes: negative  Ears/Nose/Throat: negative  Respiratory: No shortness of breath, dyspnea on  exertion, cough, or hemoptysis  Cardiovascular: negative  Gastrointestinal: negative  Genitourinary: negative  Musculoskeletal: negative  Neurologic: negative  Psychiatric: negative  Hematologic/Lymphatic/Immunologic: negative  Endocrine: negative      Objective:     /65   Pulse 79   LMP 09/30/2020   SpO2 100%   Eyes: Conjunctivae/lids: Normal   ENT: Lips:  Normal  MSK: Normal  Cardiovascular: Peripheral edema none  Pulm: Breathing Normal  Neuro/Psych: Orientation: A/O x 3 Normal; Mood/Affect: Normal, NAD, WDWN  Pt accompanied by: self  Following areas examined: Face ( patient is wearing face mask), neck, UE  Saldana skin type:ii   Findings:  Picture on phone show annular targetioid lesions on UE.   Today Red smooth papules and patches on UE  Assessment and Plan:  1) contact dermatitis vs erythema mulitforme vs urticaria and localized pruritis  Pt does not have a history of cold sores/herpes  Take one zyrtec by mouth BID  Apply a thin layer of lidex to affected area on UE and posterior neck 2x a day for 2 weeks. Tapering with improvement. Do not use on face or body folds.  Mix lotion with lidex and apply to antecubital fossa to cut the strength. Spot treat only to antecubital fossae.   Discussed side effects of topical steroids including but not limited to atrophy (skin thinning), striae (stretch marks) telangiectasias, steroid acne, and others. Do not apply to normal skin. Do not apply to discolored skin that does not have rash present. Educated patient on post inflammatory hyperpigmentation.   Moisturize 1-2x a day    Proper skin care from [facility]:    -Eliminate harsh soaps as they strip the natural oils from the skin, often resulting in dry itchy skin ( i.e. Dial, Zest, [place] Spring)  -Use mild soaps such as Cetaphil or Dove Sensitive Skin in the shower. You do not need to use soap on arms, legs, and trunk every time you shower unless visibly soiled.   -Avoid hot or cold showers.  -After  showering, lightly dry off and apply moisturizing within 2-3 minutes. This will help trap moisture in the skin.   -Aggressive use of a moisturizer at least 1-2 times a day to the entire body (including -Vanicream, Cetaphil, Aquaphor or Cerave) and moisturize hands after every washing.  -We recommend using moisturizers that come in a tub that needs to be scooped out, not a pump. This has more of an oil base. It will hold moisture in your skin much better than a water base moisturizer. The above recommended are non-pore clogging.             Follow up in 2 weeks

## 2020-10-29 ENCOUNTER — MYC REFILL (OUTPATIENT)
Dept: PALLIATIVE MEDICINE | Facility: CLINIC | Age: 40
End: 2020-10-29

## 2020-10-29 ENCOUNTER — MYC MEDICAL ADVICE (OUTPATIENT)
Dept: FAMILY MEDICINE | Facility: CLINIC | Age: 40
End: 2020-10-29

## 2020-10-29 DIAGNOSIS — G43.009 MIGRAINE WITHOUT AURA AND WITHOUT STATUS MIGRAINOSUS, NOT INTRACTABLE: ICD-10-CM

## 2020-10-29 RX ORDER — RIZATRIPTAN BENZOATE 10 MG/1
TABLET ORAL
Qty: 18 TABLET | Refills: 3 | Status: CANCELLED | OUTPATIENT
Start: 2020-10-29

## 2020-10-29 RX ORDER — RIZATRIPTAN BENZOATE 10 MG/1
TABLET ORAL
Qty: 18 TABLET | Refills: 3 | Status: SHIPPED | OUTPATIENT
Start: 2020-10-29 | End: 2021-03-04

## 2020-10-29 NOTE — TELEPHONE ENCOUNTER
Dr. Walden- see mychart message below.  Please advise.  Carolyn Franco, Linda Enciso      6/15/20 4:06 PM  Note     Called Rayshawn Love informing her the Maxalt was sent to her pharmacy.  Future refills can come from her PCP unless she would like to work with our pain program. If so, please call [phone] to set up an appointment with Cristine Nicole.     Linda Agrawal RN  Care Coordinator  [facility]         1/8/2020  [facility]    Assessment/Plan:  (G43.009) Migraine without aura and without status migrainosus, not intractable  (primary encounter diagnosis)  Comment: not controlled, tried prophylaxis medicine with no improvement, will refer to   Plan: PAIN MANAGEMENT REFERRAL        \     I have reviewed the note as documented above.  This accurately captures the substance of my conversation with the patient.        Total time of call between patient and provider was 5:30 minutes      Melva Walden MD  [facility]  [phone]    Instructions       Return in about 1 year (around 1/8/2021) for Routine physical..

## 2020-10-29 NOTE — TELEPHONE ENCOUNTER
Left message to patient per last encounter:    Good morning,      Marie,      We received your request for the refill for Rizatriptan (Maxalt) 10mg. Our records show that you have only been seen by the [facility] on January 14th, 2020 with no other appointments, we request that you contact your primary care provider to get this refill. If you are wanting to work with our clinic again please contact [phone] to get an appointment.     Thank you!      Will close encounter     Herlinda Mcdonnell [facility]  [place]

## 2020-10-30 DIAGNOSIS — G43.009 MIGRAINE WITHOUT AURA AND WITHOUT STATUS MIGRAINOSUS, NOT INTRACTABLE: ICD-10-CM

## 2020-10-30 RX ORDER — RIZATRIPTAN BENZOATE 10 MG/1
TABLET ORAL
Qty: 18 TABLET | Refills: 3 | Status: CANCELLED | OUTPATIENT
Start: 2020-10-30

## 2020-11-02 NOTE — TELEPHONE ENCOUNTER
Please call patient and find out how many doses of rizatriptan patient has used in the past month. Should be using 8 doses or fewer a month.     Oneida Young RN on 11/2/2020 at 10:30 AM

## 2020-11-04 ENCOUNTER — TELEPHONE (OUTPATIENT)
Dept: DERMATOLOGY | Facility: CLINIC | Age: 40
End: 2020-11-04

## 2020-11-04 LAB — COPATH REPORT: NORMAL

## 2020-11-04 NOTE — TELEPHONE ENCOUNTER
----- Message from Emilie Romero PA-C sent at 11/4/2020 12:03 PM CST -----  Skin, right ventral forearm, shave:   - Acute to subacute spongiotic dermatitis ( eczema)    plz continue tx and f/u as discussed. 2 weeks from last ov

## 2020-11-04 NOTE — TELEPHONE ENCOUNTER
Patient states does not need refill at this time.   Just refilled last week states she does not use more than 8 per month.

## 2020-11-04 NOTE — TELEPHONE ENCOUNTER
Called and spoke to patient.    Educated patient on biopsy results- acute to subacute spongiotic dermatitis (eczema).    Advised patient to continue tx and f/u 2 weeks from last o/v.    Patient voiced understanding.    DEX Addison-BSN-N  [facility]  [phone]

## 2020-12-13 ENCOUNTER — HEALTH MAINTENANCE LETTER (OUTPATIENT)
Age: 40
End: 2020-12-13

## 2021-03-03 DIAGNOSIS — G43.009 MIGRAINE WITHOUT AURA AND WITHOUT STATUS MIGRAINOSUS, NOT INTRACTABLE: ICD-10-CM

## 2021-03-04 RX ORDER — RIZATRIPTAN BENZOATE 10 MG/1
TABLET ORAL
Qty: 18 TABLET | Refills: 0 | Status: SHIPPED | OUTPATIENT
Start: 2021-03-04 | End: 2021-03-17

## 2021-03-04 NOTE — TELEPHONE ENCOUNTER
Please call patient and see how many doses of rizatriptan patient has used in the past month. Should be using 8 doses or fewer per month.     Oneida Young RN on 3/4/2021 at 8:55 AM

## 2021-03-17 ENCOUNTER — OFFICE VISIT (OUTPATIENT)
Dept: FAMILY MEDICINE | Facility: CLINIC | Age: 41
End: 2021-03-17
Payer: COMMERCIAL

## 2021-03-17 VITALS
OXYGEN SATURATION: 99 % | SYSTOLIC BLOOD PRESSURE: 110 MMHG | RESPIRATION RATE: 12 BRPM | BODY MASS INDEX: 25.7 KG/M2 | DIASTOLIC BLOOD PRESSURE: 70 MMHG | TEMPERATURE: 98.4 F | WEIGHT: 136 LBS | HEART RATE: 80 BPM

## 2021-03-17 DIAGNOSIS — L70.0 ACNE VULGARIS: ICD-10-CM

## 2021-03-17 DIAGNOSIS — Z12.4 SCREENING FOR MALIGNANT NEOPLASM OF CERVIX: ICD-10-CM

## 2021-03-17 DIAGNOSIS — Z13.220 LIPID SCREENING: ICD-10-CM

## 2021-03-17 DIAGNOSIS — G43.009 MIGRAINE WITHOUT AURA AND WITHOUT STATUS MIGRAINOSUS, NOT INTRACTABLE: ICD-10-CM

## 2021-03-17 DIAGNOSIS — Z11.59 ENCOUNTER FOR HEPATITIS C SCREENING TEST FOR LOW RISK PATIENT: Primary | ICD-10-CM

## 2021-03-17 LAB — HCV AB SERPL QL IA: NONREACTIVE

## 2021-03-17 PROCEDURE — G0145 SCR C/V CYTO,THINLAYER,RESCR: HCPCS | Performed by: FAMILY MEDICINE

## 2021-03-17 PROCEDURE — 86803 HEPATITIS C AB TEST: CPT | Performed by: FAMILY MEDICINE

## 2021-03-17 PROCEDURE — 36415 COLL VENOUS BLD VENIPUNCTURE: CPT | Performed by: FAMILY MEDICINE

## 2021-03-17 PROCEDURE — 80061 LIPID PANEL: CPT | Performed by: FAMILY MEDICINE

## 2021-03-17 PROCEDURE — 87624 HPV HI-RISK TYP POOLED RSLT: CPT | Performed by: FAMILY MEDICINE

## 2021-03-17 PROCEDURE — 80048 BASIC METABOLIC PNL TOTAL CA: CPT | Performed by: FAMILY MEDICINE

## 2021-03-17 PROCEDURE — 99214 OFFICE O/P EST MOD 30 MIN: CPT | Performed by: FAMILY MEDICINE

## 2021-03-17 RX ORDER — RIZATRIPTAN BENZOATE 10 MG/1
TABLET ORAL
Qty: 18 TABLET | Refills: 11 | Status: SHIPPED | OUTPATIENT
Start: 2021-03-17 | End: 2021-08-04

## 2021-03-17 RX ORDER — RIZATRIPTAN BENZOATE 10 MG/1
TABLET ORAL
Qty: 18 TABLET | Refills: 0 | Status: SHIPPED | OUTPATIENT
Start: 2021-03-17 | End: 2021-03-17

## 2021-03-17 NOTE — PROGRESS NOTES
"Future Appointments   Date Time Provider Department Center   3/17/2021 11:15 Melva Olivas MD [facility]     Appointment Notes for this encounter:   med check rizatriptan    Health Maintenance Due   Topic Date Due     ADVANCE CARE PLANNING  Never done     HEPATITIS C SCREENING  Never done     PREVENTIVE CARE VISIT  10/20/2017     HPV TEST  10/08/2018     PAP  10/08/2018     INFLUENZA VACCINE (1) 09/01/2020     Health Maintenance addressed:  Pap Smear and Flu Vaccine    Pap Smear Possibly complete today - per provider review    MyChart Status:  Active and Using      Assessment & Plan     Migraine without aura and without status migrainosus, not intractable  Well controlled, continue on   - rizatriptan (MAXALT) 10 MG tablet; TAKE 1 TABLET BY MOUTH AT ONSET OF HEADACHE FOR MIGRAINE. MAY REPEAT DOSE IN 2 HOURS, DO NOT TAKE MORE THAN 2 TABLETS IN 24 HOURS    Encounter for hepatitis C screening test for low risk patient    - Hepatitis C Screen Reflex to HCV RNA Quant and Genotype    Lipid screening    - Lipid panel reflex to direct LDL Non-fasting    Acne vulgaris  Well controlled, follows up with Dermatology.  - Basic metabolic panel  (Ca, Cl, CO2, Creat, Gluc, K, Na, BUN)    Screening for malignant neoplasm of cervix  Pt was due for pap smear today, done.  - Pap imaged thin layer screen with HPV - recommended age 30 - 65 years (select HPV order below)             BMI:   Estimated body mass index is 25.7 kg/m  as calculated from the following:    Height as of 9/16/20: 1.549 m (5' 1\").    Weight as of this encounter: 61.7 kg (136 lb).           Return in about 1 year (around 3/17/2022) for Routine physical..    Melva Walden MD  [facility]    Subjective       History of Present Illness       She eats 0-1 servings of fruits and vegetables daily.She consumes 2 sweetened beverage(s) daily.She exercises with enough effort to increase her heart rate 30 to 60 minutes per day.  She exercises with enough " effort to increase her heart rate 5 days per week.   She is taking medications regularly.       Migraine     Since your last clinic visit, how have your headaches changed?  No change    How often are you getting headaches or migraines? 2-3 times a month     Are you able to do normal daily activities when you have a migraine? Yes    Are you taking rescue/relief medications? (Select all that apply) Maxalt    How helpful is your rescue/relief medication?  I get some relief    Are you taking any medications to prevent migraines? (Select all that apply)  No    In the past 4 weeks, how often have you gone to urgent care or the emergency room because of your headaches?  0        Pt is due for pap smear today.  Pt has acne and follows up with Dermatology for that.    Review of Systems   CONSTITUTIONAL: NEGATIVE for fever, chills, change in weight      Objective    There were no vitals taken for this visit.  There is no height or weight on file to calculate BMI.  Physical Exam   GENERAL: healthy, alert and no distress  CV: regular rate and rhythm, normal S1 S2, no S3 or S4, no murmur, click or rub, no peripheral edema and peripheral pulses strong   (female): normal female external genitalia, normal urethral meatus, vaginal mucosa, normal cervix/adnexa/uterus without masses or discharge  NEURO: Normal strength and tone, mentation intact and speech normal  NEURO: cranial nerves 2-12 intact

## 2021-03-18 LAB
ANION GAP SERPL CALCULATED.3IONS-SCNC: 2 MMOL/L (ref 3–14)
BUN SERPL-MCNC: 14 MG/DL (ref 7–30)
CALCIUM SERPL-MCNC: 9 MG/DL (ref 8.5–10.1)
CHLORIDE SERPL-SCNC: 107 MMOL/L (ref 94–109)
CHOLEST SERPL-MCNC: 155 MG/DL
CO2 SERPL-SCNC: 28 MMOL/L (ref 20–32)
CREAT SERPL-MCNC: 0.8 MG/DL (ref 0.52–1.04)
GFR SERPL CREATININE-BSD FRML MDRD: >90 ML/MIN/{1.73_M2}
GLUCOSE SERPL-MCNC: 84 MG/DL (ref 70–99)
HDLC SERPL-MCNC: 56 MG/DL
LDLC SERPL CALC-MCNC: 81 MG/DL
NONHDLC SERPL-MCNC: 99 MG/DL
POTASSIUM SERPL-SCNC: 4.5 MMOL/L (ref 3.4–5.3)
SODIUM SERPL-SCNC: 137 MMOL/L (ref 133–144)
TRIGL SERPL-MCNC: 89 MG/DL

## 2021-03-19 LAB
COPATH REPORT: NORMAL
PAP: NORMAL

## 2021-03-23 LAB
FINAL DIAGNOSIS: NORMAL
HPV HR 12 DNA CVX QL NAA+PROBE: NEGATIVE
HPV16 DNA SPEC QL NAA+PROBE: NEGATIVE
HPV18 DNA SPEC QL NAA+PROBE: NEGATIVE
SPECIMEN DESCRIPTION: NORMAL
SPECIMEN SOURCE CVX/VAG CYTO: NORMAL

## 2021-04-16 ENCOUNTER — VIRTUAL VISIT (OUTPATIENT)
Dept: FAMILY MEDICINE | Facility: CLINIC | Age: 41
End: 2021-04-16
Payer: COMMERCIAL

## 2021-04-16 DIAGNOSIS — J02.9 SORE THROAT: Primary | ICD-10-CM

## 2021-04-16 PROCEDURE — 99213 OFFICE O/P EST LOW 20 MIN: CPT | Mod: 95 | Performed by: FAMILY MEDICINE

## 2021-04-16 RX ORDER — AZITHROMYCIN 250 MG/1
TABLET, FILM COATED ORAL
Qty: 6 TABLET | Refills: 0 | Status: SHIPPED | OUTPATIENT
Start: 2021-04-16 | End: 2021-04-21

## 2021-04-16 NOTE — PROGRESS NOTES
Marie is a 40 year old who is being evaluated via a billable video visit.      How would you like to obtain your AVS? MyChart  If the video visit is dropped, the invitation should be resent by: Text to cell phone: [phone]  Will anyone else be joining your video visit? No    Video Start Time: 11:17 AM    Assessment & Plan     Sore throat  Given close contact with Strep positive patient with similar symptoms,will go ahead a treat for strep throat.  Son was COVID tested as well, so we will make decision on testing for COVID once his results return. Allergy to penicillin with hives, will send in azithromycin.  Follow up as needed or pending son's COVID results.  - azithromycin (ZITHROMAX) 250 MG tablet; Take 2 tablets (500 mg) by mouth daily for 1 day, THEN 1 tablet (250 mg) daily for 4 days.    8 minutes spent on the date of the encounter doing chart review, history and exam, documentation and further activities per the note     See Patient Instructions    Return in about 3 months (around 7/16/2021), or if symptoms worsen or fail to improve, for Preventative Care Visit.    Alicia Mclaughlin MD  [facility]    Subjective   Marie is a 40 year old who presents for the following health issues     HPI     Acute Illness  Acute illness concerns: sore throat  Onset/Duration: x 2-3 days  Symptoms:  Fever: no  Chills/Sweats: no  Headache (location?): no  Sinus Pressure: no  Conjunctivitis:  no  Ear Pain: no  Rhinorrhea: no  Congestion: no  Sore Throat: YES  Cough: no  Wheeze: no  Decreased Appetite: no  Nausea: no  Vomiting: no  Diarrhea: no  Dysuria/Freq.: no  Dysuria or Hematuria: no  Fatigue/Achiness: no  Sick/Strep Exposure: YES- son dx with strep   Therapies tried and outcome: ibuprofen      Had a scratchy throat for the last 2-3 days, not particularly sore but irritated.  Son has same thing but his is more sore, he tested positive for strep throat today.  She wonders if her sore throat  "is also strep.        Review of Systems   Constitutional, HEENT, cardiovascular, pulmonary, gi and gu systems are negative, except as otherwise noted.      Objective    Vitals - Patient Reported  Weight (Patient Reported): 61.2 kg (135 lb)  Height (Patient Reported): 154.9 cm (5' 1\")  Pulse (Patient Reported): 56      Vitals:  No vitals were obtained today due to virtual visit.    Physical Exam   GENERAL: Healthy, alert and no distress  EYES: Eyes grossly normal to inspection.  No discharge or erythema, or obvious scleral/conjunctival abnormalities.  RESP: No audible wheeze, cough, or visible cyanosis.  No visible retractions or increased work of breathing.    SKIN: Visible skin clear. No significant rash, abnormal pigmentation or lesions.  NEURO: Cranial nerves grossly intact.  Mentation and speech appropriate for age.  PSYCH: Mentation appears normal, affect normal/bright, judgement and insight intact, normal speech and appearance well-groomed.            Video-Visit Details    Type of service:  Video Visit    Video End Time:11:22 AM    Originating Location (pt. Location): Home    Distant Location (provider location):  [facility]     Platform used for Video Visit: Boby"

## 2021-04-17 ENCOUNTER — HEALTH MAINTENANCE LETTER (OUTPATIENT)
Age: 41
End: 2021-04-17

## 2021-05-11 DIAGNOSIS — G43.009 MIGRAINE WITHOUT AURA AND WITHOUT STATUS MIGRAINOSUS, NOT INTRACTABLE: ICD-10-CM

## 2021-05-11 RX ORDER — RIZATRIPTAN BENZOATE 10 MG/1
TABLET ORAL
Qty: 18 TABLET | Refills: 0 | OUTPATIENT
Start: 2021-05-11

## 2021-05-11 NOTE — TELEPHONE ENCOUNTER
Should have refills on file  E-Prescribing Status: Receipt confirmed by pharmacy (3/17/2021 11:47 AM CDT)    Sid Rdz RN, BSN

## 2021-08-03 DIAGNOSIS — G43.009 MIGRAINE WITHOUT AURA AND WITHOUT STATUS MIGRAINOSUS, NOT INTRACTABLE: ICD-10-CM

## 2021-08-04 RX ORDER — RIZATRIPTAN BENZOATE 10 MG/1
TABLET ORAL
Qty: 18 TABLET | Refills: 0 | Status: SHIPPED | OUTPATIENT
Start: 2021-08-04 | End: 2022-04-07

## 2021-08-04 NOTE — TELEPHONE ENCOUNTER
Patient confirmed that she has used less than 8 treatments within the last 30 days.     Prescription approved per [facility] Refill Protocol.  DONOVAN Kahn, RN  [facility]

## 2021-09-26 ENCOUNTER — HEALTH MAINTENANCE LETTER (OUTPATIENT)
Age: 41
End: 2021-09-26

## 2021-11-22 ENCOUNTER — MYC MEDICAL ADVICE (OUTPATIENT)
Dept: FAMILY MEDICINE | Facility: CLINIC | Age: 41
End: 2021-11-22

## 2021-11-22 ENCOUNTER — E-VISIT (OUTPATIENT)
Dept: FAMILY MEDICINE | Facility: CLINIC | Age: 41
End: 2021-11-22
Payer: COMMERCIAL

## 2021-11-22 DIAGNOSIS — J01.90 ACUTE SINUSITIS WITH SYMPTOMS > 10 DAYS: Primary | ICD-10-CM

## 2021-11-22 PROCEDURE — 99421 OL DIG E/M SVC 5-10 MIN: CPT | Performed by: PHYSICIAN ASSISTANT

## 2021-11-22 RX ORDER — DOXYCYCLINE HYCLATE 100 MG
100 TABLET ORAL 2 TIMES DAILY
Qty: 14 TABLET | Refills: 0 | Status: SHIPPED | OUTPATIENT
Start: 2021-11-22 | End: 2021-11-29

## 2021-11-23 NOTE — PATIENT INSTRUCTIONS
You may want to try a nasal lavage (also known as nasal irrigation). You can find over-the-counter products, such as Neti-Pot, at retail locations or make your own at home. Instructions for homemade nasal lavage and more information on the process are available online at http://www.aafp.org/afp/2009/1115/p1121.html.      Sinusitis (Antibiotic Treatment)    The sinuses are air-filled spaces within the bones of the face. They connect to the inside of the nose. Sinusitis is an inflammation of the tissue that lines the sinuses. Sinusitis can occur during a cold. It can also happen due to allergies to pollens and other particles in the air. Sinusitis can cause symptoms of sinus congestion and a feeling of fullness. A sinus infection causes fever, headache, and facial pain. There is often green or yellow fluid draining from the nose or into the back of the throat (post-nasal drip). You have been given antibiotics to treat this condition.   Home care    Take the full course of antibiotics as instructed. Don't stop taking them, even when you feel better.    Drink plenty of water, hot tea, and other liquids as directed by the healthcare provider. This may help thin nasal mucus. It also may help your sinuses drain fluids.    Heat may help soothe painful areas of your face. Use a towel soaked in hot water. Or,  the shower and direct the warm spray onto your face. Using a vaporizer along with a menthol rub at night may also help soothe symptoms.     An expectorant with guaifenesin may help thin nasal mucus and help your sinuses drain fluids. Talk with your provider or pharmacists before taking an over-the-counter (OTC) medicine if you have any questions about it or its side effects..    You can use an OTC decongestant, unless a similar medicine was prescribed to you. Nasal sprays work the fastest. Use one that contains phenylephrine or oxymetazoline. First blow your nose gently. Then use the spray. Don't use these  medicines more often than directed on the label. If you do, your symptoms may get worse. You may also take pills that contain pseudoephedrine. Don t use products that combine multiple medicines. This is because side effects may be increased. Read labels. You can also ask the pharmacist for help. (People with high blood pressure should not use decongestants. They can raise blood pressure.) Talk with your provider or pharmacist if you have any questions about the medicine..    OTC antihistamines may help if allergies contributed to your sinusitis. Talk with your provider or pharmacist if you have any questions about the medicine..    Don't use nasal rinses or irrigation during an acute sinus infection, unless your healthcare provider tells you to. Rinsing may spread the infection to other areas in your sinuses.    Use acetaminophen or ibuprofen to control pain, unless another pain medicine was prescribed to you. If you have chronic liver or kidney disease or ever had a stomach ulcer, talk with your healthcare provider before using these medicines. Never give aspirin to anyone under age 18 who is ill with a fever. It may cause severe liver damage.    Don't smoke. This can make symptoms worse.    Follow-up care  Follow up with your healthcare provider, or as advised.   When to seek medical advice  Call your healthcare provider if any of these occur:     Facial pain or headache that gets worse    Stiff neck    Unusual drowsiness or confusion    Swelling of your forehead or eyelids    Symptoms don't go away in 10 days    Vision problems, such as blurred or double vision    Fever of 100.4 F (38 C) or higher, or as directed by your healthcare provider  Call 911  Call 911 if any of these occur:     Seizure    Trouble breathing    Feeling dizzy or faint    Fingernails, skin or lips look blue, purple , or gray  Prevention  Here are steps you can take to help prevent an infection:     Keep good hand washing habits.    Don t  have close contact with people who have sore throats, colds, or other upper respiratory infections.    Don t smoke, and stay away from secondhand smoke.    Stay up to date with of your vaccines.  Jaunt last reviewed this educational content on 12/1/2019 2000-2021 [employer]. All rights reserved. This information is not intended as a substitute for professional medical care. Always follow your healthcare professional's instructions.        Dear Marie Lal,    We are sorry you are not feeling well. Based on the responses you provided, it is recommended that you be seen in-person in urgent care so we can better evaluate your symptoms. Please click here to find the nearest urgent care location to you.   You will not be charged for this Visit. Thank you for trusting us with your care.    Sandor Walters PA-C

## 2021-12-02 ENCOUNTER — E-VISIT (OUTPATIENT)
Dept: FAMILY MEDICINE | Facility: CLINIC | Age: 41
End: 2021-12-02
Payer: COMMERCIAL

## 2021-12-02 DIAGNOSIS — J01.90 ACUTE SINUSITIS WITH SYMPTOMS > 10 DAYS: Primary | ICD-10-CM

## 2021-12-02 PROCEDURE — 99207 PR NON-BILLABLE SERV PER CHARTING: CPT | Performed by: FAMILY MEDICINE

## 2021-12-02 NOTE — PATIENT INSTRUCTIONS
Thank you for choosing us for your care. I think an in-clinic visit would be best next steps based on your symptoms. Please schedule a clinic appointment; you won t be charged for this eVisit.      You can schedule an appointment right here in [facility], or call [phone]

## 2021-12-03 ENCOUNTER — OFFICE VISIT (OUTPATIENT)
Dept: INTERNAL MEDICINE | Facility: CLINIC | Age: 41
End: 2021-12-03
Payer: COMMERCIAL

## 2021-12-03 VITALS
TEMPERATURE: 98.1 F | RESPIRATION RATE: 22 BRPM | BODY MASS INDEX: 25.51 KG/M2 | HEART RATE: 99 BPM | SYSTOLIC BLOOD PRESSURE: 99 MMHG | DIASTOLIC BLOOD PRESSURE: 70 MMHG | OXYGEN SATURATION: 98 % | WEIGHT: 135 LBS

## 2021-12-03 DIAGNOSIS — J01.90 ACUTE SINUSITIS WITH SYMPTOMS > 10 DAYS: Primary | ICD-10-CM

## 2021-12-03 PROCEDURE — 99214 OFFICE O/P EST MOD 30 MIN: CPT | Performed by: INTERNAL MEDICINE

## 2021-12-03 RX ORDER — AZITHROMYCIN 250 MG/1
TABLET, FILM COATED ORAL
Qty: 6 TABLET | Refills: 0 | Status: SHIPPED | OUTPATIENT
Start: 2021-12-03 | End: 2021-12-08

## 2021-12-03 RX ORDER — PREDNISONE 20 MG/1
20 TABLET ORAL 2 TIMES DAILY
Qty: 10 TABLET | Refills: 0 | Status: SHIPPED | OUTPATIENT
Start: 2021-12-03 | End: 2022-04-20

## 2021-12-03 ASSESSMENT — ENCOUNTER SYMPTOMS
CARDIOVASCULAR NEGATIVE: 1
CONSTITUTIONAL NEGATIVE: 1
RESPIRATORY NEGATIVE: 1
SINUS PRESSURE: 1
SINUS PAIN: 1
SORE THROAT: 1
GASTROINTESTINAL NEGATIVE: 1
MUSCULOSKELETAL NEGATIVE: 1

## 2021-12-03 NOTE — PROGRESS NOTES
Assessment & Plan     Acute sinusitis with symptoms > 10 days  At this time, I do suspect that the patient is dealing with a persistent case of acute sinusitis despite her recent treatment with doxycycline.  I do feel that it is worthwhile for us to try an alternative antibiotic given her issues with ongoing symptoms.  Patient is allergic to penicillin, therefore we will place her on azithromycin 250 mg tablets with instructions take 2 tabs on day 1 followed by 1 tablet by mouth per day for 4 additional days.  We will also place her on a short course of prednisone 20 mg by mouth twice per day to take along concurrently with the antibiotic.  I did encourage the patient to try to limit her use of Afrin as she may be exacerbating her symptoms.  Should her symptoms persist, then it may be worthwhile to have her be reevaluated by ENT given her history of nasal polyps.  - azithromycin (ZITHROMAX) 250 MG tablet  Dispense: 6 tablet; Refill: 0  - predniSONE (DELTASONE) 20 MG tablet  Dispense: 10 tablet; Refill: 0        20 minutes spent on the date of the encounter doing chart review, history and exam, documentation and further activities per the note       See Patient Instructions    No follow-ups on file.    Kana Merchant MD  [facility]    Chuy Salazar is a 41 year old who presents for the following health issues: Sinus concerns. Had an E-Visit 12/2/21.    Patient is a 41-year-old  female who presents to the clinic with sinus symptoms.  She has had persistent issues with sinus pressure and congestion for approximately 3 weeks.  She was seen by a physician at an outside facility who did prescribe her a 7-day course of doxycycline for suspected acute sinusitis.  Unfortunately, patient has had no improvement with the doxycycline.  She continues to have a significant amount of sinus pain over her maxillary sinuses bilaterally and her frontal sinus.  Patient does report  bilateral ear pain and a sore throat.  She denies any issues with fevers or chills.  Of note, she does have a prior history of nasal polyps that did require sinus surgery a few years ago.  Patient has had no known sick contacts.  He has been utilizing Afrin every 3-4 hours for management of her secretions.       Acute Illness  Acute illness concerns: Sinus concerns  Onset/Duration: about 3 weeks  Symptoms:  Fever: no  Chills/Sweats: no  Headache (location?): YES  Sinus Pressure: YES  Conjunctivitis:  no  Ear Pain: YES: bilateral  Rhinorrhea: YES  Congestion: YES  Sore Throat: no  Cough: no  Wheeze: no  Decreased Appetite: no  Nausea: no  Vomiting: no  Diarrhea: no  Dysuria/Freq.: no  Dysuria or Hematuria: no  Fatigue/Achiness: no  Sick/Strep Exposure: no  Therapies tried and outcome: Net-ipot, Afrin      Review of Systems   Constitutional: Negative.    HENT: Positive for congestion, ear pain, sinus pressure, sinus pain and sore throat.    Respiratory: Negative.    Cardiovascular: Negative.    Gastrointestinal: Negative.    Genitourinary: Negative.    Musculoskeletal: Negative.             Objective    Blood pressure 99/70, pulse 99, temperature 98.1  F (36.7  C), resp. rate 22, weight 61.2 kg (135 lb), SpO2 98 %, not currently breastfeeding.      Physical Exam  Vitals and nursing note reviewed.   HENT:      Head: Normocephalic and atraumatic.      Right Ear: Tympanic membrane, ear canal and external ear normal.      Left Ear: Tympanic membrane, ear canal and external ear normal.      Nose: Nasal tenderness: maxillary and frontal sinus TTP.      Right Sinus: Maxillary sinus tenderness and frontal sinus tenderness present.      Left Sinus: Maxillary sinus tenderness and frontal sinus tenderness present.      Mouth/Throat:      Mouth: Mucous membranes are moist.      Pharynx: Oropharynx is clear.   Eyes:      Extraocular Movements: Extraocular movements intact.      Conjunctiva/sclera: Conjunctivae normal.      Pupils:  Pupils are equal, round, and reactive to light.   Cardiovascular:      Rate and Rhythm: Normal rate and regular rhythm.      Pulses: Normal pulses.      Heart sounds: Normal heart sounds.   Pulmonary:      Effort: Pulmonary effort is normal.      Breath sounds: Normal breath sounds.   Abdominal:      General: Abdomen is flat.      Palpations: Abdomen is soft.   Skin:     General: Skin is warm.      Capillary Refill: Capillary refill takes less than 2 seconds.   Neurological:      Mental Status: She is alert.

## 2022-04-07 DIAGNOSIS — G43.009 MIGRAINE WITHOUT AURA AND WITHOUT STATUS MIGRAINOSUS, NOT INTRACTABLE: ICD-10-CM

## 2022-04-07 RX ORDER — RIZATRIPTAN BENZOATE 10 MG/1
TABLET ORAL
Qty: 18 TABLET | Refills: 0 | Status: SHIPPED | OUTPATIENT
Start: 2022-04-07 | End: 2022-04-20

## 2022-04-07 NOTE — TELEPHONE ENCOUNTER
Routing refill request to provider for review/approval because:  Patient needs to be seen because it has been more than 1 year since last office visit.    Carolyn Franco RN

## 2022-04-20 ENCOUNTER — VIRTUAL VISIT (OUTPATIENT)
Dept: FAMILY MEDICINE | Facility: CLINIC | Age: 42
End: 2022-04-20
Payer: COMMERCIAL

## 2022-04-20 DIAGNOSIS — G43.009 MIGRAINE WITHOUT AURA AND WITHOUT STATUS MIGRAINOSUS, NOT INTRACTABLE: ICD-10-CM

## 2022-04-20 PROCEDURE — 99213 OFFICE O/P EST LOW 20 MIN: CPT | Mod: 95 | Performed by: FAMILY MEDICINE

## 2022-04-20 RX ORDER — RIZATRIPTAN BENZOATE 10 MG/1
10 TABLET ORAL
Qty: 18 TABLET | Refills: 3 | Status: SHIPPED | OUTPATIENT
Start: 2022-04-20 | End: 2022-10-20

## 2022-04-20 NOTE — PROGRESS NOTES
Marie is a 41 year old who is being evaluated via a billable telephone visit.      What phone number would you like to be contacted at? [phone]  How would you like to obtain your AVS? MyChart    Assessment & Plan     Migraine without aura and without status migrainosus, not intractable  Controlled, cotninue on   - rizatriptan (MAXALT) 10 MG tablet; Take 1 tablet (10 mg) by mouth at onset of headache for migraine                 No follow-ups on file.    Melva Walden MD  [facility]   Marie is a 41 year old who presents for the following health issues     History of Present Illness       Reason for visit:  Migraine med check up  Symptom onset:  More than a month  Symptoms include:  Headaches  Symptom intensity:  Moderate  Symptom progression:  Staying the same  Had these symptoms before:  Yes  Has tried/received treatment for these symptoms:  Yes  Previous treatment was successful:  Yes  Prior treatment description:  Rizatriptan  What makes it worse:  No Rizatriptan  What makes it better:  Rizatriptan    She eats 0-1 servings of fruits and vegetables daily.She consumes 1 sweetened beverage(s) daily.She exercises with enough effort to increase her heart rate 30 to 60 minutes per day.  She exercises with enough effort to increase her heart rate 5 days per week.   She is taking medications regularly.       Pt gets 2 a month and sometimes up to 5 a month.      Review of Systems         Objective           Vitals:  No vitals were obtained today due to virtual visit.    Physical Exam   healthy, alert and no distress  PSYCH: Alert and oriented times 3; coherent speech, normal   rate and volume, able to articulate logical thoughts, able   to abstract reason, no tangential thoughts, no hallucinations   or delusions  Her affect is normal  RESP: No cough, no audible wheezing, able to talk in full sentences  Remainder of exam unable to be completed due to telephone visits                 Phone call duration: 7 minutes

## 2022-05-08 ENCOUNTER — HEALTH MAINTENANCE LETTER (OUTPATIENT)
Age: 42
End: 2022-05-08

## 2022-05-12 ENCOUNTER — MYC MEDICAL ADVICE (OUTPATIENT)
Dept: FAMILY MEDICINE | Facility: CLINIC | Age: 42
End: 2022-05-12
Payer: COMMERCIAL

## 2022-10-18 DIAGNOSIS — G43.009 MIGRAINE WITHOUT AURA AND WITHOUT STATUS MIGRAINOSUS, NOT INTRACTABLE: ICD-10-CM

## 2022-10-20 ENCOUNTER — MYC MEDICAL ADVICE (OUTPATIENT)
Dept: PEDIATRICS | Facility: CLINIC | Age: 42
End: 2022-10-20

## 2022-10-20 RX ORDER — RIZATRIPTAN BENZOATE 10 MG/1
TABLET ORAL
Qty: 18 TABLET | Refills: 2 | Status: SHIPPED | OUTPATIENT
Start: 2022-10-20 | End: 2023-01-17

## 2022-10-20 NOTE — TELEPHONE ENCOUNTER
Patient returned call, she has taken 6 does of Rizatriptan in the past month.     Oneida Young RN on 10/20/2022 at 3:24 PM    
Eyeglasses

## 2022-10-20 NOTE — TELEPHONE ENCOUNTER
Prescription approved per [facility] Refill Protocol.  Rodriguez DOLAN RN 10/20/2022 at 3:27 PM

## 2022-10-20 NOTE — TELEPHONE ENCOUNTER
Attempted to reach patient, LVMTCB. Need to check if they have used 8 or more treatments in the past 30 day period. Vee[employer] message sent at this time.      Rodriguez DOLAN RN 10/20/2022 at 3:18 PM

## 2022-10-20 NOTE — TELEPHONE ENCOUNTER
Patient returned call, she has taken 6 does of Rizatriptan in the past month.     Oneida Young RN on 10/20/2022 at 3:24 PM

## 2023-01-14 ENCOUNTER — HEALTH MAINTENANCE LETTER (OUTPATIENT)
Age: 43
End: 2023-01-14

## 2023-01-14 ENCOUNTER — TELEPHONE (OUTPATIENT)
Dept: FAMILY MEDICINE | Facility: CLINIC | Age: 43
End: 2023-01-14
Payer: COMMERCIAL

## 2023-01-14 DIAGNOSIS — G43.009 MIGRAINE WITHOUT AURA AND WITHOUT STATUS MIGRAINOSUS, NOT INTRACTABLE: ICD-10-CM

## 2023-01-17 RX ORDER — RIZATRIPTAN BENZOATE 10 MG/1
TABLET ORAL
Qty: 18 TABLET | Refills: 0 | Status: SHIPPED | OUTPATIENT
Start: 2023-01-17 | End: 2023-01-17

## 2023-01-17 RX ORDER — RIZATRIPTAN BENZOATE 10 MG/1
10 TABLET ORAL
Qty: 18 TABLET | Refills: 0 | Status: SHIPPED | OUTPATIENT
Start: 2023-01-17 | End: 2023-03-17

## 2023-01-17 NOTE — TELEPHONE ENCOUNTER
I called the pharmacy and they verified that patient is filling this RX for a Qty of 18 once monthly.     Patient pays for this RX out of pocket so the pharmacy states they don't get a flag on her usage.  Pharmacy is asking that the provider write on the RX how many pills may be dispensed per month.  Thank you.  BELLA Melendez RN

## 2023-01-17 NOTE — TELEPHONE ENCOUNTER
AA, SEE NOTES, pt lives in [place] now, does not need refill now, PLEASE RESEND RX WITH AMOUNT YOU WANT PT TO USE IN ONE MONTH, added 8 per month note, IF OKAY APPROVE AND CLOSE    [facility] calls, informs:    ~pt received #18 on 12/22/23, not due  ~informs would like quantity on rx with how many month supply is    Called pt, informs:  ~amount varies and depends on stress etc, normally uses 8-10 in a bad month  ~pt did not request, has supply, informs pharmacy called asking and she agreed  ~PT INFORMS MOVED TO [place] IN June, WILL NOT BE SEEING DR CAMARENA AND AGREES TO ESTABLISH CARE in [place] when annual visit due    Yasmine Arguello, RN, BSN  [facility]

## 2023-03-17 DIAGNOSIS — G43.009 MIGRAINE WITHOUT AURA AND WITHOUT STATUS MIGRAINOSUS, NOT INTRACTABLE: ICD-10-CM

## 2023-03-17 RX ORDER — RIZATRIPTAN BENZOATE 10 MG/1
10 TABLET ORAL
Qty: 18 TABLET | Refills: 0 | Status: SHIPPED | OUTPATIENT
Start: 2023-03-17

## 2023-03-17 NOTE — TELEPHONE ENCOUNTER
Routing refill request to provider for review/approval because:  Last refill encounter from 1/14/23 states patient moved to [place]   Blood pressure under 140/90 in past 12 months    BP Readings from Last 6 Encounters:   12/03/21 99/70   03/17/21 110/70   10/26/20 101/65   10/01/20 93/60   09/16/20 90/62   01/14/20 103/66     Halie Gómez Registered Nurse  [facility]

## 2023-03-17 NOTE — TELEPHONE ENCOUNTER
This is the last fill. Patient has moved to [place] and needs to establish with new provider. Please call her to let her know.

## 2023-03-20 NOTE — TELEPHONE ENCOUNTER
Spoke with pt and informed her that her prescription was refilled but she will need to establish care with a new provider in [place].   Lila Eddy MA

## 2023-06-02 ENCOUNTER — HEALTH MAINTENANCE LETTER (OUTPATIENT)
Age: 43
End: 2023-06-02

## 2024-02-11 ENCOUNTER — HEALTH MAINTENANCE LETTER (OUTPATIENT)
Age: 44
End: 2024-02-11

## 2024-06-30 ENCOUNTER — HEALTH MAINTENANCE LETTER (OUTPATIENT)
Age: 44
End: 2024-06-30